# Patient Record
Sex: FEMALE | Race: BLACK OR AFRICAN AMERICAN | NOT HISPANIC OR LATINO | Employment: UNEMPLOYED | URBAN - METROPOLITAN AREA
[De-identification: names, ages, dates, MRNs, and addresses within clinical notes are randomized per-mention and may not be internally consistent; named-entity substitution may affect disease eponyms.]

---

## 2017-02-28 ENCOUNTER — OFFICE VISIT (OUTPATIENT)
Dept: GASTROENTEROLOGY | Facility: CLINIC | Age: 37
End: 2017-02-28
Payer: COMMERCIAL

## 2017-02-28 VITALS
BODY MASS INDEX: 28.47 KG/M2 | SYSTOLIC BLOOD PRESSURE: 130 MMHG | HEART RATE: 84 BPM | DIASTOLIC BLOOD PRESSURE: 90 MMHG | HEIGHT: 61 IN | WEIGHT: 150.81 LBS

## 2017-02-28 DIAGNOSIS — K21.9 GASTROESOPHAGEAL REFLUX DISEASE WITHOUT ESOPHAGITIS: ICD-10-CM

## 2017-02-28 DIAGNOSIS — R13.14 PHARYNGOESOPHAGEAL DYSPHAGIA: Primary | ICD-10-CM

## 2017-02-28 PROCEDURE — 1160F RVW MEDS BY RX/DR IN RCRD: CPT | Mod: S$GLB,,, | Performed by: INTERNAL MEDICINE

## 2017-02-28 PROCEDURE — 99203 OFFICE O/P NEW LOW 30 MIN: CPT | Mod: S$GLB,,, | Performed by: INTERNAL MEDICINE

## 2017-02-28 PROCEDURE — 99999 PR PBB SHADOW E&M-EST. PATIENT-LVL III: CPT | Mod: PBBFAC,,, | Performed by: INTERNAL MEDICINE

## 2017-02-28 NOTE — PROGRESS NOTES
Subjective:       Patient ID: Renee Ruelas is a 36 y.o. female.    Chief Complaint: Dysphagia    HPI Comments: About 4 years ago the patient had a problem with trauma to her esophagus after a problem with a large pill. She has had problems since then with swallowing. She had a dilation at that time by [facility]. She did well for a while but now she is having recurrent problems. She has no problem with liquids. There is no problem with ice cream or mashed potatoes. She does have problem with rice, peas and corn. She has problems with chips and bread ( bread and cake like foods give significant problems. She has no true problem with fish but eats this rarely. She has problems with chicken especialy white meat. She is fearful of eating steak.    She denies abdominal pain, nausea or vomiting. There is positive heartburn. There is no anorexia or weight loss. There is no aversion to the sight or smell of food. There is no BRBPR or melena. There is no FH of GI problems.  The patient does carry a diagnosis of GERD.    Review of Systems   Constitutional: Negative for activity change, appetite change, chills, diaphoresis, fatigue, fever and unexpected weight change.   HENT: Negative for congestion, ear discharge, ear pain, hearing loss, nosebleeds, postnasal drip and tinnitus.    Eyes: Negative for photophobia and visual disturbance.   Respiratory: Negative for apnea, cough, choking, chest tightness, shortness of breath and wheezing.    Cardiovascular: Negative for chest pain, palpitations and leg swelling.   Gastrointestinal: Negative for abdominal distention, abdominal pain, anal bleeding, blood in stool, constipation, diarrhea, nausea, rectal pain and vomiting.   Genitourinary: Negative for difficulty urinating, dyspareunia, dysuria, flank pain, frequency, hematuria, menstrual problem, pelvic pain, urgency, vaginal bleeding and vaginal discharge.   Musculoskeletal: Negative for arthralgias,  back pain, gait problem, joint swelling, myalgias and neck stiffness.   Skin: Negative for pallor and rash.   Neurological: Negative for dizziness, tremors, seizures, syncope, speech difficulty, weakness, numbness and headaches.   Hematological: Negative for adenopathy.   Psychiatric/Behavioral: Negative for agitation, confusion, hallucinations, sleep disturbance and suicidal ideas.       Objective:      Physical Exam   Constitutional: She is oriented to person, place, and time. She appears well-developed and well-nourished.   HENT:   Head: Normocephalic and atraumatic.   Bilateral turbinate congestion   Eyes: Conjunctivae and EOM are normal. Pupils are equal, round, and reactive to light. Right eye exhibits no discharge. Left eye exhibits no discharge. No scleral icterus.   Neck: Normal range of motion. Neck supple. No JVD present. No thyromegaly present.   Cardiovascular: Normal rate, regular rhythm, normal heart sounds and intact distal pulses.  Exam reveals no gallop and no friction rub.    No murmur heard.  Pulmonary/Chest: Effort normal and breath sounds normal. No respiratory distress. She has no wheezes. She has no rales. She exhibits no tenderness.   Abdominal: Soft. Bowel sounds are normal. She exhibits no distension and no mass. There is no tenderness. There is no rebound and no guarding.   Musculoskeletal: Normal range of motion. She exhibits no edema.   Lymphadenopathy:     She has no cervical adenopathy.   Neurological: She is alert and oriented to person, place, and time. She has normal reflexes. She exhibits normal muscle tone. Coordination normal.   Skin: Skin is warm and dry. No rash noted. No erythema. No pallor.   Psychiatric: She has a normal mood and affect. Her behavior is normal. Judgment and thought content normal.   Vitals reviewed.      Assessment:     Dysphagia    GERD  No diagnosis found.    Plan:       Esophagram and EGD

## 2017-02-28 NOTE — MR AVS SNAPSHOT
[facility]  [address] [facility]  [place] [address]  Phone: [phone]  Fax: [phone]                  Renee Ruelas   2017 9:00 AM   Office Visit    Description:  Female : 1980   Provider:  Lee Sharp III, MD   Department:  [facility]           Reason for Visit     Dysphagia           Diagnoses this Visit        Comments    Pharyngoesophageal dysphagia    -  Primary     Gastroesophageal reflux disease without esophagitis                To Do List           Goals (5 Years of Data)     None      [facility]     [facility] On Call Nurse Care Line -  Assistance  Registered nurses in the [facility] On Call Center provide clinical advisement, health education, appointment booking, and other advisory services.  Call for this free service at [phone].             Medications           Message regarding Medications     Verify the changes and/or additions to your medication regime listed below are the same as discussed with your clinician today.  If any of these changes or additions are incorrect, please notify your healthcare provider.             Verify that the below list of medications is an accurate representation of the medications you are currently taking.  If none reported, the list may be blank. If incorrect, please contact your healthcare provider. Carry this list with you in case of emergency.           Current Medications     hydrOXYzine pamoate (VISTARIL) 25 MG Cap Take 1 capsule (25 mg total) by mouth every 6 (six) hours as needed (prn palpitations).           Clinical Reference Information           Your Vitals Were     BP                   130/90           Blood Pressure          Most Recent Value    BP  (!)  130/90      Allergies as of 2017     Sulfur      Immunizations Administered on Date of Encounter - 2017     None      Orders Placed During Today's Visit      Normal Orders This Visit    Case request GI:  ESOPHAGOGASTRODUODENOSCOPY (EGD)     Future Labs/Procedures Expected by Expires    FL Esophagram Complete  2/28/2017 2/28/2018      [facility] Sign-Up     Activating your [facility] account is as easy as 1-2-3!     1) Visit [email or web address], select Sign Up Now, enter this activation code and your date of birth, then select Next.  [email or web address]  Expires: 4/14/2017 10:42 AM      2) Create a username and password to use when you visit [facility] in the future and select a security question in case you lose your password and select Next.    3) Enter your e-mail address and click Sign Up!    Additional Information  If you have questions, please e-mail [email or web address] or call [phone] to talk to our [facility] staff. Remember, [facility] is NOT to be used for urgent needs. For medical emergencies, dial 911.         Language Assistance Services     ATTENTION: Language assistance services are available, free of charge. Please call [phone].      ATENCIÓN: Si habla español, tiene a herrera disposición servicios gratuitos de asistencia lingüística. Llame al [phone].     CHÚ Ý: N?u b?n nói Ti?ng Vi?t, có các d?ch v? h? tr? ngôn ng? mi?n phí dành cho b?n. G?i s? [phone].         [facility] complies with applicable Federal civil rights laws and does not discriminate on the basis of race, color, national origin, age, disability, or sex.

## 2017-03-01 ENCOUNTER — TELEPHONE (OUTPATIENT)
Dept: GASTROENTEROLOGY | Facility: CLINIC | Age: 37
End: 2017-03-01

## 2017-03-01 NOTE — TELEPHONE ENCOUNTER
----- Message from Evans Tanner sent at 3/1/2017 12:36 PM CST -----  Contact: Pt  States she is calling to schedule her test and can be reached at [phone]//thanks/dbw

## 2017-03-03 ENCOUNTER — HOSPITAL ENCOUNTER (OUTPATIENT)
Dept: RADIOLOGY | Facility: HOSPITAL | Age: 37
Discharge: HOME | End: 2017-03-03
Attending: INTERNAL MEDICINE
Payer: COMMERCIAL

## 2017-03-03 DIAGNOSIS — K21.9 GASTROESOPHAGEAL REFLUX DISEASE WITHOUT ESOPHAGITIS: ICD-10-CM

## 2017-03-03 DIAGNOSIS — R13.14 PHARYNGOESOPHAGEAL DYSPHAGIA: ICD-10-CM

## 2017-03-03 PROCEDURE — 74220 X-RAY XM ESOPHAGUS 1CNTRST: CPT | Mod: 26,,, | Performed by: RADIOLOGY

## 2017-03-03 PROCEDURE — 74220 X-RAY XM ESOPHAGUS 1CNTRST: CPT | Mod: TC,PO

## 2017-03-07 ENCOUNTER — TELEPHONE (OUTPATIENT)
Dept: GASTROENTEROLOGY | Facility: CLINIC | Age: 37
End: 2017-03-07

## 2017-03-07 NOTE — TELEPHONE ENCOUNTER
----- Message from Terra Yee sent at 3/7/2017 11:27 AM CST -----  Contact: Pt   Pt is requesting to speak with the nurse in regards to her upcoming procedure./ Pt can be reached at [phone]

## 2017-03-08 ENCOUNTER — SURGERY (OUTPATIENT)
Age: 37
End: 2017-03-08

## 2017-03-08 ENCOUNTER — ANESTHESIA EVENT (OUTPATIENT)
Dept: ENDOSCOPY | Facility: HOSPITAL | Age: 37
End: 2017-03-08
Payer: COMMERCIAL

## 2017-03-08 ENCOUNTER — HOSPITAL ENCOUNTER (OUTPATIENT)
Facility: HOSPITAL | Age: 37
Discharge: HOME | End: 2017-03-08
Attending: INTERNAL MEDICINE | Admitting: INTERNAL MEDICINE
Payer: COMMERCIAL

## 2017-03-08 ENCOUNTER — ANESTHESIA (OUTPATIENT)
Dept: ENDOSCOPY | Facility: HOSPITAL | Age: 37
End: 2017-03-08
Payer: COMMERCIAL

## 2017-03-08 VITALS
BODY MASS INDEX: 28.32 KG/M2 | HEART RATE: 78 BPM | HEIGHT: 61 IN | TEMPERATURE: 98 F | SYSTOLIC BLOOD PRESSURE: 125 MMHG | OXYGEN SATURATION: 98 % | WEIGHT: 150 LBS | DIASTOLIC BLOOD PRESSURE: 72 MMHG | RESPIRATION RATE: 16 BRPM

## 2017-03-08 DIAGNOSIS — R13.10 DYSPHAGIA: ICD-10-CM

## 2017-03-08 DIAGNOSIS — R13.14 PHARYNGOESOPHAGEAL DYSPHAGIA: Primary | ICD-10-CM

## 2017-03-08 DIAGNOSIS — F41.9 ANXIETY: ICD-10-CM

## 2017-03-08 LAB
B-HCG UR QL: NEGATIVE
CTP QC/QA: YES

## 2017-03-08 PROCEDURE — 25000003 PHARM REV CODE 250: Performed by: INTERNAL MEDICINE

## 2017-03-08 PROCEDURE — 63600175 PHARM REV CODE 636 W HCPCS: Performed by: NURSE ANESTHETIST, CERTIFIED REGISTERED

## 2017-03-08 PROCEDURE — 25000003 PHARM REV CODE 250: Performed by: NURSE ANESTHETIST, CERTIFIED REGISTERED

## 2017-03-08 PROCEDURE — 27201012 HC FORCEPS, HOT/COLD, DISP: Performed by: INTERNAL MEDICINE

## 2017-03-08 PROCEDURE — 88305 TISSUE EXAM BY PATHOLOGIST: CPT | Mod: 26,,, | Performed by: PATHOLOGY

## 2017-03-08 PROCEDURE — 81025 URINE PREGNANCY TEST: CPT | Performed by: INTERNAL MEDICINE

## 2017-03-08 PROCEDURE — 37000008 HC ANESTHESIA 1ST 15 MINUTES: Performed by: INTERNAL MEDICINE

## 2017-03-08 PROCEDURE — 43239 EGD BIOPSY SINGLE/MULTIPLE: CPT | Performed by: INTERNAL MEDICINE

## 2017-03-08 PROCEDURE — 88305 TISSUE EXAM BY PATHOLOGIST: CPT | Performed by: PATHOLOGY

## 2017-03-08 PROCEDURE — 43239 EGD BIOPSY SINGLE/MULTIPLE: CPT | Mod: ,,, | Performed by: INTERNAL MEDICINE

## 2017-03-08 RX ORDER — PROPOFOL 10 MG/ML
VIAL (ML) INTRAVENOUS
Status: DISCONTINUED | OUTPATIENT
Start: 2017-03-08 | End: 2017-03-08

## 2017-03-08 RX ORDER — LIDOCAINE HYDROCHLORIDE 10 MG/ML
INJECTION INFILTRATION; PERINEURAL
Status: DISCONTINUED | OUTPATIENT
Start: 2017-03-08 | End: 2017-03-08

## 2017-03-08 RX ORDER — SODIUM CHLORIDE, SODIUM LACTATE, POTASSIUM CHLORIDE, CALCIUM CHLORIDE 600; 310; 30; 20 MG/100ML; MG/100ML; MG/100ML; MG/100ML
INJECTION, SOLUTION INTRAVENOUS CONTINUOUS
Status: DISCONTINUED | OUTPATIENT
Start: 2017-03-08 | End: 2017-03-08 | Stop reason: HOSPADM

## 2017-03-08 RX ADMIN — SODIUM CHLORIDE, SODIUM LACTATE, POTASSIUM CHLORIDE, AND CALCIUM CHLORIDE: 600; 310; 30; 20 INJECTION, SOLUTION INTRAVENOUS at 12:03

## 2017-03-08 RX ADMIN — PROPOFOL 50 MG: 10 INJECTION, EMULSION INTRAVENOUS at 02:03

## 2017-03-08 RX ADMIN — PROPOFOL 100 MG: 10 INJECTION, EMULSION INTRAVENOUS at 02:03

## 2017-03-08 RX ADMIN — LIDOCAINE HYDROCHLORIDE 40 MG: 10 INJECTION, SOLUTION INFILTRATION; PERINEURAL at 02:03

## 2017-03-08 NOTE — INTERVAL H&P NOTE
The patient has been examined and the H&P has been reviewed:  Family History   Problem Relation Age of Onset    Diabetes Father     Kidney disease Father      Past Medical History:   Diagnosis Date    Anxiety     Esophageal stricture     GERD (gastroesophageal reflux disease)     Migraine headache      Past Surgical History:   Procedure Laterality Date    ESOPHAGEAL DILATION      ESOPHAGOGASTRODUODENOSCOPY       Social History     Social History    Marital status:      Spouse name: N/A    Number of children: N/A    Years of education: N/A     Occupational History    Not on file.     Social History Main Topics    Smoking status: Never Smoker    Smokeless tobacco: Never Used    Alcohol use No    Drug use: No    Sexual activity: Yes     Partners: Male     Other Topics Concern    Not on file     Social History Narrative     Review of patient's allergies indicates:   Allergen Reactions    Sulfur Swelling     No current facility-administered medications on file prior to encounter.      Current Outpatient Prescriptions on File Prior to Encounter   Medication Sig Dispense Refill    hydrOXYzine pamoate (VISTARIL) 25 MG Cap Take 1 capsule (25 mg total) by mouth every 6 (six) hours as needed (prn palpitations). 20 capsule 0           Anesthesia/Surgery risks, benefits and alternative options discussed and understood by patient/family.          There are no hospital problems to display for this patient.

## 2017-03-08 NOTE — INTERVAL H&P NOTE
The patient has been examined and the H&P has been reviewed:I have reviewed this note and I agree with this assessment. The patient was seen in the GI office and remains stable for endoscopy at the time of this present evaluation.         Anesthesia/Surgery risks, benefits and alternative options discussed and understood by patient/family.          There are no hospital problems to display for this patient.

## 2017-03-08 NOTE — IP AVS SNAPSHOT
[address] [place] [address]           Patient Discharge Instructions     Our goal is to set you up for success. This packet includes information on your condition, medications, and your home care. It will help you to care for yourself so you don't get sicker and need to go back to the hospital.     Please ask your nurse if you have any questions.        There are many details to remember when preparing to leave the hospital. Here is what you will need to do:    1. Take your medicine. If you are prescribed medications, review your Medication List in the following pages. You may have new medications to  at the pharmacy and others that you'll need to stop taking. Review the instructions for how and when to take your medications. Talk with your doctor or nurses if you are unsure of what to do.     2. Go to your follow-up appointments. Specific follow-up information is listed in the following pages. Your may be contacted by a transition nurse or clinical provider about future appointments. Be sure we have all of the phone numbers to reach you, if needed. Please contact your provider's office if you are unable to make an appointment.     3. Watch for warning signs. Your doctor or nurse will give you detailed warning signs to watch for and when to call for assistance. These instructions may also include educational information about your condition. If you experience any of warning signs to your health, call your doctor.               ** Verify the list of medication(s) below is accurate and up to date. Carry this with you in case of emergency. If your medications have changed, please notify your healthcare provider.             Medication List      ASK your doctor about these medications        Additional Info                      hydrOXYzine pamoate 25 MG Cap   Commonly known as:  VISTARIL   Quantity:  20 capsule   Refills:  0   Dose:  25 mg    Instructions:  Take  1 capsule (25 mg total) by mouth every 6 (six) hours as needed (prn palpitations).     Begin Date    AM    Noon    PM    Bedtime                  Please bring to all follow up appointments:    1. A copy of your discharge instructions.  2. All medicines you are currently taking in their original bottles.  3. Identification and insurance card.    Please arrive 15 minutes ahead of scheduled appointment time.    Please call 24 hours in advance if you must reschedule your appointment and/or time.            Discharge Instructions         Upper GI Endoscopy     During endoscopy, a long, flexible tube is used to view the inside of your upper GI tract.      Upper GI endoscopy allows your healthcare provider to look directly into the beginning of your gastrointestinal (GI) tract. The esophagus, stomach, and duodenum (the first part of the small intestine) make up the upper GI tract.   Before the exam  Follow these and any other instructions you are given before your endoscopy. If you dont follow the healthcare providers instructions carefully, the test may need to be canceled or done over:  · Don't eat or drink anything after midnight the night before your exam. If your exam is in the afternoon, drink only clear liquids in the morning. Don't eat or drink anything for 8 hours before the exam. In some cases, you may be able to take medicines with sips of water until 2 hours before the procedure. Speak with your healthcare provider about this.   · Bring your X-rays and any other test results you have.  · Because you will be sedated, arrange for an adult to drive you home after the exam.  · Tell your healthcare provider before the exam if you are taking any medicines or have any medical problems.  The procedure  Here is what to expect:  · You will lie on the endoscopy table. Usually patients lie on the left side.  · You will be monitored and given oxygen.  · Your throat may be numbed with a spray or gargle. You are given  "medicine through an intravenous (IV) line that will help you relax and remain comfortable. You may be awake or asleep during the procedure.  · The healthcare provider will put the endoscope in your mouth and down your esophagus. It is thinner than most pieces of food that you swallow. It will not affect your breathing. The medicine helps keep you from gagging.  · Air is put into your GI tract to expand it. It can make you burp.  · During the procedure, the healthcare provider can take biopsies (tissue samples), remove abnormalities, such as polyps, or treat abnormalities through a variety of devices placed through the endoscope. You will not feel this.   · The endoscope carries images of your upper GI tract to a video screen. If you are awake, you may be able to look at the images.  · After the procedure is done, you will rest for a time. An adult must drive you home.  When to call your healthcare provider  Contact your healthcare provider if you have:  · Black or tarry stools, or blood in your stool  · Fever  · Pain in your belly that does not go away  · Nausea and vomiting, or vomiting blood   Date Last Reviewed: 7/1/2016  © 0469-4011 [employer]. [address] [place] [address]. All rights reserved. This information is not intended as a substitute for professional medical care. Always follow your healthcare professional's instructions.            Admission Information     Date & Time Provider Department CSN    3/8/2017 11:20 AM Lee Sharp III, MD [facility] [id number]      Care Providers     Provider Role Specialty Primary office phone    Lee Sharp III, MD Attending Provider Gastroenterology [phone]    Lee Sharp III, MD Surgeon  Gastroenterology [phone]      Your Vitals Were     BP Pulse Temp Resp Height Weight    136/99 (BP Location: Left arm, Patient Position: Lying, BP Method: Automatic) 92 97.7 °F (36.5 °C) (Oral) 16 5' 1" (1.549 m) 68 kg " (150 lb)    Last Period SpO2 BMI          02/24/2017 99% 28.34 kg/m2        Recent Lab Values     No lab values to display.      Pending Labs     Order Current Status    Specimen to Pathology - Surgery Collected (03/08/17 1437)      Allergies as of 3/8/2017        Reactions    Sulfur Swelling      [facility]     [facility] Care [facility] - 24/7 Assistance  Unless otherwise directed by your provider, please contact [facility], our nurse care line that is available for 24/7 assistance.     Registered nurses in the [facility] provide clinical advisement, health education, appointment booking, and other advisory services.  Call for this free service at [phone].        Advance Directives     An advance directive is a document which, in the event you are no longer able to make decisions for yourself, tells your healthcare team what kind of treatment you do or do not want to receive, or who you would like to make those decisions for you.  If you do not currently have an advance directive, [facility] encourages you to create one.  For more information call:  [phone]), [phone] ([phone]),  or log on to [email or web address].        Language Assistance Services     ATTENTION: Language assistance services are available, free of charge. Please call [phone].      ATENCIÓN: Si habla español, tiene a herrera disposición servicios gratuitos de asistencia lingüística. Llame al [phone].     CHÚ Ý: N?u b?n nói Ti?ng Vi?t, có các d?ch v? h? tr? ngôn ng? mi?n phí dành cho b?n. G?i s? [phone].        [facility] Sign-Up     Activating your [facility] account is as easy as 1-2-3!     1) Visit [email or web address], select Sign Up Now, enter this activation code and your date of birth, then select Next.  [email or web address]  Expires: 4/14/2017 10:42 AM      2) Create a username and password to use when you visit [facility] in the future and select a security question in case  you lose your password and select Next.    3) Enter your e-mail address and click Sign Up!    Additional Information  If you have questions, please e-mail [email or web address] or call [phone] to talk to our [facility] staff. Remember, [facility] is NOT to be used for urgent needs. For medical emergencies, dial 911.          [facility] complies with applicable Federal civil rights laws and does not discriminate on the basis of race, color, national origin, age, disability, or sex.

## 2017-03-08 NOTE — ANESTHESIA RELEASE NOTE
"Anesthesia Release from PACU Note    Patient: Renee Ruelas    Procedure(s) Performed: Procedure(s) (LRB):  ESOPHAGOGASTRODUODENOSCOPY (EGD) (N/A)    Anesthesia type: MAC    Post pain: Adequate analgesia    Post assessment: no apparent anesthetic complications, tolerated procedure well and no evidence of recall    Last Vitals:   Visit Vitals    /88    Pulse 92    Temp 36.5 °C (97.7 °F) (Oral)    Resp 16    Ht 5' 1" (1.549 m)    Wt 68 kg (150 lb)    LMP 02/24/2017    SpO2 98%    Breastfeeding No    BMI 28.34 kg/m2       Post vital signs: stable    Level of consciousness: awake and alert     Nausea/Vomiting: no nausea/no vomiting    Complications: none    Airway Patency: patent    Respiratory: unassisted, spontaneous ventilation    Cardiovascular: stable and blood pressure at baseline    Hydration: euvolemic  "

## 2017-03-08 NOTE — ANESTHESIA POSTPROCEDURE EVALUATION
"Anesthesia Post Evaluation    Patient: Renee Ruelas    Procedure(s) Performed: Procedure(s) (LRB):  ESOPHAGOGASTRODUODENOSCOPY (EGD) (N/A)    Final Anesthesia Type: MAC  Patient location during evaluation: GI PACU  Patient participation: Yes- Able to Participate  Level of consciousness: awake and alert  Post-procedure vital signs: reviewed and stable  Pain management: adequate  Airway patency: patent  PONV status at discharge: No PONV  Anesthetic complications: no      Cardiovascular status: blood pressure returned to baseline  Respiratory status: unassisted and spontaneous ventilation  Hydration status: euvolemic  Follow-up not needed.        Visit Vitals    /88    Pulse 92    Temp 36.5 °C (97.7 °F) (Oral)    Resp 16    Ht 5' 1" (1.549 m)    Wt 68 kg (150 lb)    LMP 02/24/2017    SpO2 98%    Breastfeeding No    BMI 28.34 kg/m2       Pain/Tony Score: Pain Assessment Performed: Yes (3/8/2017  2:56 PM)  Presence of Pain: denies (3/8/2017  2:56 PM)  Tony Score: 10 (3/8/2017  2:56 PM)      "

## 2017-03-08 NOTE — DISCHARGE INSTRUCTIONS

## 2017-03-08 NOTE — ANESTHESIA PREPROCEDURE EVALUATION
03/08/2017  Renee Rueals is a 36 y.o., female.    [facility] Anesthesia Evaluation    I have reviewed the Patient Summary Reports.     I have reviewed the Medications.     Review of Systems  Anesthesia Hx:  No problems with previous Anesthesia    Hepatic/GI:   Esophageal stricture   Neurological:   Headaches        Physical Exam  General:  Well nourished    Airway/Jaw/Neck:  Airway Findings: Mouth Opening: Normal Tongue: Normal  Mallampati: II  TM Distance: Normal, at least 6 cm      Dental:  Dental Findings: In tact   Chest/Lungs:  Chest/Lungs Findings: Normal Respiratory Rate     Heart/Vascular:  Heart Findings: Rate: Normal  Rhythm: Regular Rhythm             Anesthesia Plan  Type of Anesthesia, risks & benefits discussed:  Anesthesia Type:  MAC  Patient's Preference:   Intra-op Monitoring Plan:   Intra-op Monitoring Plan Comments:   Post Op Pain Control Plan:   Post Op Pain Control Plan Comments:   Induction:   IV  Beta Blocker:  Patient is not currently on a Beta-Blocker (No further documentation required).       Informed Consent: Patient understands risks and agrees with Anesthesia plan.  Questions answered. Anesthesia consent signed with patient.  ASA Score: 2     Day of Surgery Review of History & Physical: I have interviewed and examined the patient. I have reviewed the patient's H&P dated:   Significant changes noted: Surgeon notified.          Ready For Surgery From Anesthesia Perspective.

## 2017-03-08 NOTE — TRANSFER OF CARE
"Anesthesia Transfer of Care Note    Patient: Renee Ruelas    Procedure(s) Performed: Procedure(s) (LRB):  ESOPHAGOGASTRODUODENOSCOPY (EGD) (N/A)    Patient location: GI    Anesthesia Type: MAC    Transport from OR: Transported from OR on room air with adequate spontaneous ventilation    Post pain: adequate analgesia    Post assessment: no apparent anesthetic complications    Post vital signs: stable    Level of consciousness: awake    Nausea/Vomiting: no nausea/vomiting    Complications: none          Last vitals:   Visit Vitals    /88    Pulse 92    Temp 36.5 °C (97.7 °F) (Oral)    Resp 16    Ht 5' 1" (1.549 m)    Wt 68 kg (150 lb)    LMP 02/24/2017    SpO2 98%    Breastfeeding No    BMI 28.34 kg/m2     "

## 2017-03-08 NOTE — DISCHARGE SUMMARY
[facility]  Brief Operative Note     SUMMARY     Surgery Date: 3/8/2017     Surgeon(s) and Role:     * Lee Sharp III, MD - Primary    Assisting Surgeon: None    Pre-op Diagnosis:  Gastroesophageal reflux disease without esophagitis [K21.9]  Pharyngoesophageal dysphagia [R13.14]    Post-op Diagnosis:  Post-Op Diagnosis Codes:     * Gastroesophageal reflux disease without esophagitis [K21.9]     * Pharyngoesophageal dysphagia [R13.14]     - Gastric Polyps  Procedure(s) (LRB):  ESOPHAGOGASTRODUODENOSCOPY (EGD) (N/A)    Anesthesia: Monitor Anesthesia Care    Description of the findings of the procedure: Procedures completed. See Procedure note for full details.    Findings/Key Components: Procedures completed. See Procedure note for full details.    Prosthetics/Devices: None    Estimated Blood Loss: * No values recorded between 3/8/2017 12:00 AM and 3/8/2017  2:48 PM *         Specimens:   Specimen (12h ago through future)    Start     Ordered    03/08/17 1434  Specimen to Pathology - Surgery  Once     Comments:  1. Gastric corpus polyps    03/08/17 1437          Discharge Note    SUMMARY     Admit Date: 3/8/2017    Discharge Date and Time: 3/8/2017    Hospital Course (synopsis of major diagnoses, care, treatment, and services provided during the course of the hospital stay):  Procedures completed. See Procedure note for full details. Discharge patient when discharge criteria met.    Final Diagnosis: Post-Op Diagnosis Codes:     * Gastroesophageal reflux disease without esophagitis [K21.9]     * Pharyngoesophageal dysphagia [R13.14]      - Gastric Polyps  Disposition: Discharge patient when discharge criteria met.    Follow Up/Patient Instructions:       Medications:  Reconciled Home Medications: Current Discharge Medication List      STOP taking these medications       hydrOXYzine pamoate (VISTARIL) 25 MG Cap Comments:   Reason for Stopping:                Discharge Procedure Orders  Diet  general     Activity as tolerated

## 2017-03-14 ENCOUNTER — TELEPHONE (OUTPATIENT)
Dept: GASTROENTEROLOGY | Facility: CLINIC | Age: 37
End: 2017-03-14

## 2017-03-14 NOTE — TELEPHONE ENCOUNTER
----- Message from Hailee Treviño sent at 3/14/2017  8:57 AM CDT -----  Pt is returning a call from nurse to f/u with her procedure.            Please call pt back at [phone]

## 2017-03-15 RX ORDER — PANTOPRAZOLE SODIUM 20 MG/1
20 TABLET, DELAYED RELEASE ORAL DAILY
Qty: 30 TABLET | Refills: 0 | Status: CANCELLED | OUTPATIENT
Start: 2017-03-15 | End: 2018-03-15

## 2017-03-15 RX ORDER — HYDROGEN PEROXIDE 3 %
20 SOLUTION, NON-ORAL MISCELLANEOUS
Qty: 30 CAPSULE | Refills: 0 | Status: SHIPPED | OUTPATIENT
Start: 2017-03-15 | End: 2017-11-27 | Stop reason: ALTCHOICE

## 2017-03-15 NOTE — TELEPHONE ENCOUNTER
----- Message from Anuja Kemp sent at 3/14/2017 12:11 PM CDT -----  Contact: Pt  Pt states she is returning nurse call, please contact pt at [phone]

## 2017-03-15 NOTE — TELEPHONE ENCOUNTER
----- Message from Alka Gross sent at 3/15/2017 10:30 AM CDT -----  Contact: pt  Pt is requesting to speak with nurse regarding throat issues and being unable to eat. Pt stated she had surgery on 3/8/17. Pls call pt back at [phone]

## 2017-04-06 ENCOUNTER — TELEPHONE (OUTPATIENT)
Dept: GASTROENTEROLOGY | Facility: CLINIC | Age: 37
End: 2017-04-06

## 2017-04-06 NOTE — TELEPHONE ENCOUNTER
Please review path results dated 03/08/17 and advice recall to stay the same as procedure recommendation.

## 2017-11-27 ENCOUNTER — OFFICE VISIT (OUTPATIENT)
Dept: GASTROENTEROLOGY | Facility: CLINIC | Age: 37
End: 2017-11-27
Payer: COMMERCIAL

## 2017-11-27 VITALS
SYSTOLIC BLOOD PRESSURE: 225 MMHG | WEIGHT: 159.63 LBS | DIASTOLIC BLOOD PRESSURE: 88 MMHG | HEART RATE: 89 BPM | HEIGHT: 61 IN | BODY MASS INDEX: 30.14 KG/M2

## 2017-11-27 DIAGNOSIS — K21.9 GASTROESOPHAGEAL REFLUX DISEASE WITHOUT ESOPHAGITIS: ICD-10-CM

## 2017-11-27 DIAGNOSIS — R13.14 PHARYNGOESOPHAGEAL DYSPHAGIA: Primary | ICD-10-CM

## 2017-11-27 PROCEDURE — 99214 OFFICE O/P EST MOD 30 MIN: CPT | Mod: S$GLB,,, | Performed by: NURSE PRACTITIONER

## 2017-11-27 PROCEDURE — 99999 PR PBB SHADOW E&M-EST. PATIENT-LVL III: CPT | Mod: PBBFAC,,, | Performed by: NURSE PRACTITIONER

## 2017-11-27 RX ORDER — ESOMEPRAZOLE MAGNESIUM 40 MG/1
40 CAPSULE, DELAYED RELEASE ORAL
Qty: 30 CAPSULE | Refills: 3 | Status: SHIPPED | OUTPATIENT
Start: 2017-11-27 | End: 2019-07-09

## 2017-11-27 NOTE — PROGRESS NOTES
[facility] Note    Reason for Visit:  The primary encounter diagnosis was Pharyngoesophageal dysphagia. A diagnosis of Gastroesophageal reflux disease without esophagitis was also pertinent to this visit.    PCP:   Primary Doctor No       Referring MD:  No referring provider defined for this encounter.    HPI:  This is a 37 y.o. female here for evaluation of pharyngoesophageal dysphagia and Gerd. Has been worked up at  [facility] in [place] and with Dr. Sharp in february. She is new to me.     4 years ago had a trauma to her esophagus with a large pill. Since has had problems with swallowing. She had a dilation at that time by [place]. Gu[place] at [facility]. She did well for a while but began having recurrent problems. Hx of taking Nexium and Xanax and relief with dysphagia. Reports esophageal manometry done in 2012 at [facility] and normal.     Currently, pt reports fearful of eating solids. Feels solids getting stuck and hung up in esophagus. Will have one episode occur every few months where food will get hung up in esophagus. Denies difficulty swallowing liquids, initiating first swallow, painful swallowing, SOB, inducing vomiting, and CP. Last EGD in February when worked up with Dr. Sharp. EGD 3/8/17 No gross lesions in esophagus, few gastric polyps removed, and normal duodenum. Esophogram 3/3/17 Mild intermittent GE reflux, otherwise normal. NSAID usage- none.     Hx of Gerd. Pyrosis occurring a couple times per week. Denies acidic taste in mouth and regurgitation. No current PPI use.      Normal formed stool daily. Denies abdominal pain, blood in stool, and black tarry stool.     ROS:  Constitutional: No fevers, no chills, No unintentional weight loss, no fatigue   ENT: No allergies  CV: No chest pain, no palpitations, no perif. edema  Pulm: No cough, No shortness of breath, no wheezes, no sputum  Ophtho: No vision changes  GI: see HPI; also no nausea, no vomiting, no change in  "appetite  Derm: No rash  Heme: No lymphadenopathy, No bruising  MSK: No arthritis, no muscle pain, no muscle weakness  : No dysuria, No hematuria  Endo: No hot or cold intolerance  Neuro: No syncope, No seizure     Medical History:  has a past medical history of Anxiety; Esophageal stricture; GERD (gastroesophageal reflux disease); and Migraine headache.    Surgical History:  has a past surgical history that includes Esophageal dilation and Esophagogastroduodenoscopy.    Family History: family history includes Diabetes in her father; Kidney disease in her father; Lymphoma in her mother..     Social History:  reports that she has never smoked. She has never used smokeless tobacco. She reports that she does not drink alcohol or use drugs.    Review of patient's allergies indicates:   Allergen Reactions    Sulfur Swelling     Current Outpatient Prescriptions   Medication Sig    esomeprazole (NEXIUM) 40 MG capsule Take 1 capsule (40 mg total) by mouth before breakfast. Take 30-45 minutes before first protein containing meal.     No current facility-administered medications for this visit.      Objective Findings:    Vital Signs:  BP (!) 225/88   Pulse 89   Ht 5' 1" (1.549 m)   Wt 72.4 kg (159 lb 9.8 oz)   BMI 30.16 kg/m²   Body mass index is 30.16 kg/m².    Physical Exam:  General Appearance: Mood appears anxious, no acute distress.   Head: Normocephalic, without obvious abnormality  Eyes: No scleral icterus, EOMI  ENT: Neck supple, Lips, mucosa, and tongue normal; teeth and gums normal  Lungs: CTA bilaterally in anterior and posterior fields, no wheezes, no crackles.  Heart: Regular rate and rhythm, no murmurs heard  Abdomen: Soft, non tender, non distended with positive bowel sounds in all four quadrants.  Extremities: No clubbing, cyanosis or edema  Skin: No rash to exposed areas  Neurologic: AAOx4    Labs:  Lab Results   Component Value Date    WBC 9.00 11/10/2016    HGB 12.8 11/10/2016    HCT 37.4 " 11/10/2016     11/10/2016    ALT 14 11/10/2016    AST 19 11/10/2016     11/10/2016    K 4.5 11/10/2016     11/10/2016    CREATININE 0.9 11/10/2016    BUN 14 11/10/2016    CO2 23 11/10/2016     Imaging:  Esophogram 3/3/17 Mild intermittent GE reflux, otherwise normal.    Endoscopy:    EGD- 3/8/17 No gross lesions in esophagus, few gastric polyps removed, and normal duodenum.     Assessment:  1. Pharyngoesophageal dysphagia    2. Gastroesophageal reflux disease without esophagitis      Recommendations:  1. & 2. Recommended esophageal manometry and pt deferring at this time Will trial Nexium 40 mg daily and see if relief.      Requesting [facility] records [facility] [place].     Follow up in 4 weeks.     Orders Placed This Encounter    esomeprazole (NEXIUM) 40 MG capsule     Thank you so much for allowing me to participate in the care of Renee York, ROSARIO, FNP-C

## 2018-01-02 ENCOUNTER — DOCUMENTATION ONLY (OUTPATIENT)
Dept: GASTROENTEROLOGY | Facility: CLINIC | Age: 38
End: 2018-01-02

## 2018-01-02 NOTE — PROGRESS NOTES
[facility].     EGD 5/23/2012- Nodule in lowers esophagus, bx obtained. Otherwise unremarkable. Dilation performed at entire esophagus.     Recommendation of esophageal manometry if dysphagia persists.     Path report- Esophagus nodule bx- squamous and glandular mucosa with mild reactive changes negative for intestinal metaplasia, dysplasia, and malignancies.     Ying York, SYEDA-C

## 2019-07-09 ENCOUNTER — OFFICE VISIT (OUTPATIENT)
Dept: OTOLARYNGOLOGY | Facility: CLINIC | Age: 39
End: 2019-07-09
Payer: COMMERCIAL

## 2019-07-09 VITALS
WEIGHT: 162.25 LBS | DIASTOLIC BLOOD PRESSURE: 93 MMHG | HEART RATE: 71 BPM | SYSTOLIC BLOOD PRESSURE: 131 MMHG | BODY MASS INDEX: 30.66 KG/M2 | TEMPERATURE: 98 F

## 2019-07-09 DIAGNOSIS — R13.19 ESOPHAGEAL DYSPHAGIA: Primary | ICD-10-CM

## 2019-07-09 DIAGNOSIS — K21.9 GASTROESOPHAGEAL REFLUX DISEASE, ESOPHAGITIS PRESENCE NOT SPECIFIED: ICD-10-CM

## 2019-07-09 DIAGNOSIS — F41.9 ANXIETY: ICD-10-CM

## 2019-07-09 PROCEDURE — 99204 OFFICE O/P NEW MOD 45 MIN: CPT | Mod: S$GLB,,, | Performed by: NURSE PRACTITIONER

## 2019-07-09 PROCEDURE — 3008F BODY MASS INDEX DOCD: CPT | Mod: CPTII,S$GLB,, | Performed by: NURSE PRACTITIONER

## 2019-07-09 PROCEDURE — 3008F PR BODY MASS INDEX (BMI) DOCUMENTED: ICD-10-PCS | Mod: CPTII,S$GLB,, | Performed by: NURSE PRACTITIONER

## 2019-07-09 PROCEDURE — 99999 PR PBB SHADOW E&M-EST. PATIENT-LVL IV: CPT | Mod: PBBFAC,,, | Performed by: NURSE PRACTITIONER

## 2019-07-09 PROCEDURE — 99999 PR PBB SHADOW E&M-EST. PATIENT-LVL IV: ICD-10-PCS | Mod: PBBFAC,,, | Performed by: NURSE PRACTITIONER

## 2019-07-09 PROCEDURE — 99204 PR OFFICE/OUTPT VISIT, NEW, LEVL IV, 45-59 MIN: ICD-10-PCS | Mod: S$GLB,,, | Performed by: NURSE PRACTITIONER

## 2019-07-09 RX ORDER — FLUTICASONE PROPIONATE 50 MCG
2 SPRAY, SUSPENSION (ML) NASAL DAILY
Qty: 16 G | Refills: 12 | Status: SHIPPED | OUTPATIENT
Start: 2019-07-09 | End: 2019-08-08

## 2019-07-09 RX ORDER — FLUTICASONE PROPIONATE 50 MCG
2 SPRAY, SUSPENSION (ML) NASAL DAILY
Qty: 16 G | Refills: 12 | Status: SHIPPED | OUTPATIENT
Start: 2019-07-09 | End: 2019-07-09 | Stop reason: SDUPTHER

## 2019-07-09 RX ORDER — PANTOPRAZOLE SODIUM 40 MG/1
40 TABLET, DELAYED RELEASE ORAL DAILY
COMMUNITY
End: 2019-07-11 | Stop reason: SDUPTHER

## 2019-07-09 NOTE — PROGRESS NOTES
"Subjective:       Patient ID: Renee Ruelas is a 38 y.o. female.    Chief Complaint: Consult (h/o throat dilation 2012, 2017, still has trouble with throat closing)    HPI   Renee Ruelas is a 38 year old female who presents the the head and neck clinic for a sensation that her throat is closing. The problem initially began in 2012 after a pill got stuck in her esophagus. She reports dysphagia to solids 1-2 times monthly. The problem occurs randomly and with different types of foods. She will develop a sensation that her throat is tightening and will have to wait for food to pass. She will try to swallow water to push the food down. She is able to get liquids down, but will sometimes feel that she has food stuck in her throat for several hours. Her symptoms will then self resolve. She has had several esophageal dilations, most recently in March, 2017. She did not have any relief of her symptoms. She reports she had a "swallowing test" recently in [place], and was told it was normal. She does not have those records with her today. She has a frequent sore throat and hoarse voice. This is worse in the morning. She also complains of post nasal drip and a sensation of mucus in her throat. She reports heartburn and acid reflux. She has frequent cough and throat clearing. Last week she started taking Protonix twice daily. She has not noticed any effects yet. She is a non smoker.    She reports that she has anxiety and thinks this may be related to her swallowing concerns. She does not take medication for this.    Past Medical History:   Diagnosis Date    Anxiety     Esophageal stricture     GERD (gastroesophageal reflux disease)     Migraine headache        Past Surgical History:   Procedure Laterality Date    ESOPHAGEAL DILATION      ESOPHAGOGASTRODUODENOSCOPY      ESOPHAGOGASTRODUODENOSCOPY (EGD) N/A 3/8/2017    Performed by Lee Sharp III, MD at [facility]         Current Outpatient " Medications:     pantoprazole (PROTONIX) 40 MG tablet, Take 40 mg by mouth once daily., Disp: , Rfl:     fluticasone propionate (FLONASE) 50 mcg/actuation nasal spray, 2 sprays (100 mcg total) by Each Nare route once daily., Disp: 16 g, Rfl: 12    ranitidine (ZANTAC) 150 MG tablet, Take 1 tablet (150 mg total) by mouth nightly., Disp: 30 tablet, Rfl: 11    Review of patient's allergies indicates:   Allergen Reactions    Sulfur Swelling       Social History     Socioeconomic History    Marital status:      Spouse name: Not on file    Number of children: Not on file    Years of education: Not on file    Highest education level: Not on file   Occupational History    Not on file   Social Needs    Financial resource strain: Not on file    Food insecurity:     Worry: Not on file     Inability: Not on file    Transportation needs:     Medical: Not on file     Non-medical: Not on file   Tobacco Use    Smoking status: Never Smoker    Smokeless tobacco: Never Used   Substance and Sexual Activity    Alcohol use: No    Drug use: No    Sexual activity: Yes     Partners: Male   Lifestyle    Physical activity:     Days per week: Not on file     Minutes per session: Not on file    Stress: Not on file   Relationships    Social connections:     Talks on phone: Not on file     Gets together: Not on file     Attends Worship service: Not on file     Active member of club or organization: Not on file     Attends meetings of clubs or organizations: Not on file     Relationship status: Not on file   Other Topics Concern    Not on file   Social History Narrative    Not on file       Family History   Problem Relation Age of Onset    Diabetes Father     Kidney disease Father     Lymphoma Mother     Colon cancer Neg Hx     Colon polyps Neg Hx     Esophageal cancer Neg Hx     Irritable bowel syndrome Neg Hx     Inflammatory bowel disease Neg Hx     Stomach cancer Neg Hx        Review of Systems    Constitutional: Negative for appetite change, chills, diaphoresis, fatigue, fever and unexpected weight change.   HENT: Positive for postnasal drip, sore throat, trouble swallowing and voice change. Negative for congestion, dental problem, drooling, ear discharge, ear pain, facial swelling, hearing loss, mouth sores, nosebleeds, rhinorrhea, sinus pressure, sneezing and tinnitus.    Eyes: Negative for pain, discharge, redness and itching.   Respiratory: Positive for cough and shortness of breath.    Cardiovascular: Positive for chest pain.   Gastrointestinal: Positive for nausea. Negative for abdominal distention, abdominal pain, diarrhea and vomiting.   Endocrine: Negative for cold intolerance and heat intolerance.   Genitourinary: Negative for difficulty urinating.   Musculoskeletal: Negative for neck pain and neck stiffness.   Skin: Negative for rash.   Neurological: Negative for dizziness, weakness and headaches.   Hematological: Negative for adenopathy.   Psychiatric/Behavioral: The patient is nervous/anxious.        Objective:      Physical Exam   Constitutional: She is oriented to person, place, and time. She appears well-developed and well-nourished. She is cooperative. She does not appear ill. No distress.   HENT:   Head: Normocephalic and atraumatic.   Right Ear: Hearing, tympanic membrane, external ear and ear canal normal.   Left Ear: Hearing, tympanic membrane, external ear and ear canal normal.   Nose: Nose normal. No mucosal edema, rhinorrhea or nasal deformity. No epistaxis.  No foreign bodies. Right sinus exhibits no maxillary sinus tenderness and no frontal sinus tenderness. Left sinus exhibits no maxillary sinus tenderness and no frontal sinus tenderness.   Mouth/Throat: Uvula is midline, oropharynx is clear and moist and mucous membranes are normal. Mucous membranes are not pale, not dry and not cyanotic. She does not have dentures. No oral lesions. No trismus in the jaw. Normal dentition. No  uvula swelling or dental caries. No oropharyngeal exudate, posterior oropharyngeal edema, posterior oropharyngeal erythema or tonsillar abscesses.   Eyes: Conjunctivae are normal. Right eye exhibits no discharge. Left eye exhibits no discharge. No scleral icterus.   Neck: Trachea normal, normal range of motion and phonation normal. Neck supple. No JVD present. No tracheal tenderness present. No tracheal deviation present. No thyroid mass and no thyromegaly present.   Salivary glands - there are no lesions or asymmetric findings in the submandibular or parotid glands     Cardiovascular: Normal rate.   Pulmonary/Chest: Effort normal. No stridor. No respiratory distress.   Lymphadenopathy:        Head (right side): No submental, no submandibular, no tonsillar and no preauricular adenopathy present.        Head (left side): No submental, no submandibular, no tonsillar and no preauricular adenopathy present.     She has no cervical adenopathy.   Neurological: She is alert and oriented to person, place, and time. No cranial nerve deficit.   Skin: Skin is warm and dry. No rash noted. She is not diaphoretic. No erythema. No pallor.   Psychiatric: She has a normal mood and affect. Her behavior is normal. Thought content normal.   Vitals reviewed.          Assessment:       1. Esophageal dysphagia    2. Gastroesophageal reflux disease, esophagitis presence not specified    3. Anxiety        Plan:       Problem List Items Addressed This Visit        Psychiatric    Anxiety    Relevant Orders    Ambulatory consult to Psychology       GI    Dysphagia - Primary     38 year old female with a several year history of dysphagia. We discussed flexible laryngoscopy, however patient is anxious about undergoing the exam and chose to defer it today. We discussed that she should return to see GI. She states she wants instant relief of her GERD symptoms. We discussed taking Zantac at night and Protonix in the morning for now. We also  discussed that her anxiety may play a role in her dysphagia. She is interested in discussing this with a psychologist. Referrals to GI and psychology placed. Questions answered. RTC prn.          Relevant Orders    Ambulatory Referral to Gastroenterology    GERD (gastroesophageal reflux disease)    Relevant Orders    Ambulatory Referral to Gastroenterology          This was a 30 minute long visit with greater than 50% of the time devoted to counseling with the patient.

## 2019-07-10 PROBLEM — K21.9 GERD (GASTROESOPHAGEAL REFLUX DISEASE): Status: ACTIVE | Noted: 2019-07-10

## 2019-07-10 NOTE — ASSESSMENT & PLAN NOTE
38 year old female with a several year history of dysphagia. We discussed flexible laryngoscopy, however patient is anxious about undergoing the exam and chose to defer it today. We discussed that she should return to see GI. She states she wants instant relief of her GERD symptoms. We discussed taking Zantac at night and Protonix in the morning for now. We also discussed that her anxiety may play a role in her dysphagia. She is interested in discussing this with a psychologist. Referrals to GI and psychology placed. Questions answered. RTC prn.

## 2019-07-11 ENCOUNTER — OFFICE VISIT (OUTPATIENT)
Dept: GASTROENTEROLOGY | Facility: CLINIC | Age: 39
End: 2019-07-11
Payer: COMMERCIAL

## 2019-07-11 VITALS
WEIGHT: 168 LBS | DIASTOLIC BLOOD PRESSURE: 76 MMHG | SYSTOLIC BLOOD PRESSURE: 126 MMHG | HEART RATE: 68 BPM | HEIGHT: 61 IN | BODY MASS INDEX: 31.72 KG/M2

## 2019-07-11 DIAGNOSIS — K21.9 GASTROESOPHAGEAL REFLUX DISEASE WITHOUT ESOPHAGITIS: ICD-10-CM

## 2019-07-11 DIAGNOSIS — R13.14 PHARYNGOESOPHAGEAL DYSPHAGIA: Primary | ICD-10-CM

## 2019-07-11 PROCEDURE — 99999 PR PBB SHADOW E&M-EST. PATIENT-LVL III: ICD-10-PCS | Mod: PBBFAC,,, | Performed by: NURSE PRACTITIONER

## 2019-07-11 PROCEDURE — 99214 PR OFFICE/OUTPT VISIT, EST, LEVL IV, 30-39 MIN: ICD-10-PCS | Mod: S$GLB,,, | Performed by: NURSE PRACTITIONER

## 2019-07-11 PROCEDURE — 3008F BODY MASS INDEX DOCD: CPT | Mod: CPTII,S$GLB,, | Performed by: NURSE PRACTITIONER

## 2019-07-11 PROCEDURE — 99999 PR PBB SHADOW E&M-EST. PATIENT-LVL III: CPT | Mod: PBBFAC,,, | Performed by: NURSE PRACTITIONER

## 2019-07-11 PROCEDURE — 3008F PR BODY MASS INDEX (BMI) DOCUMENTED: ICD-10-PCS | Mod: CPTII,S$GLB,, | Performed by: NURSE PRACTITIONER

## 2019-07-11 PROCEDURE — 99214 OFFICE O/P EST MOD 30 MIN: CPT | Mod: S$GLB,,, | Performed by: NURSE PRACTITIONER

## 2019-07-11 RX ORDER — PANTOPRAZOLE SODIUM 40 MG/1
40 TABLET, DELAYED RELEASE ORAL 2 TIMES DAILY
Qty: 60 TABLET | Refills: 2 | Status: SHIPPED | OUTPATIENT
Start: 2019-07-11 | End: 2020-06-20 | Stop reason: SDUPTHER

## 2019-07-12 NOTE — PROGRESS NOTES
Clinic Consult:  [facility] Consultation Note    Reason for Consult:  The primary encounter diagnosis was Pharyngoesophageal dysphagia. A diagnosis of Gastroesophageal reflux disease without esophagitis was also pertinent to this visit.    PCP: Primary Doctor No       HPI:  This is a 38 y.o. female here for evaluation of the above  Pt states that over the last few weeks, she has had a progressive worsening of dysphagia with epigastric abdominal burning. She states that the dysphagia is to solids only. Tolerating liquids without coughing.   Has had nocturnal reflux symptoms.   Denies any melena or hematochezia  No change in medications.  No NSAID's or ETOH.    Has been taking PPI daily and Ranitidine at bedtime.  Has not had any significant improvement in the symptoms.   Last EGD 2017 without any significant findings.         Review of Systems   Constitutional: Negative for chills, fever, malaise/fatigue and weight loss.   Respiratory: Negative for cough.    Cardiovascular: Negative for chest pain.   Gastrointestinal:        Per HPI   Musculoskeletal: Negative for myalgias.   Skin: Negative for itching and rash.   Neurological: Negative for headaches.   Psychiatric/Behavioral: The patient is nervous/anxious.        Medical History:   Past Medical History:   Diagnosis Date    Anxiety     Esophageal stricture     GERD (gastroesophageal reflux disease)     Migraine headache        Surgical History:  Past Surgical History:   Procedure Laterality Date    ESOPHAGEAL DILATION      ESOPHAGOGASTRODUODENOSCOPY      ESOPHAGOGASTRODUODENOSCOPY (EGD) N/A 3/8/2017    Performed by Lee Sharp III, MD at [facility]       Family History:   Family History   Problem Relation Age of Onset    Diabetes Father     Kidney disease Father     Lymphoma Mother     Colon cancer Neg Hx     Colon polyps Neg Hx     Esophageal cancer Neg Hx     Irritable bowel syndrome Neg Hx     Inflammatory bowel disease Neg Hx   "   Stomach cancer Neg Hx        Social History:   Social History     Tobacco Use    Smoking status: Never Smoker    Smokeless tobacco: Never Used   Substance Use Topics    Alcohol use: No    Drug use: No       Allergies: Reviewed    Home Medications:   Current Outpatient Medications on File Prior to Visit   Medication Sig Dispense Refill    fluticasone propionate (FLONASE) 50 mcg/actuation nasal spray 2 sprays (100 mcg total) by Each Nare route once daily. 16 g 12    ranitidine (ZANTAC) 150 MG tablet Take 1 tablet (150 mg total) by mouth nightly. 30 tablet 11     No current facility-administered medications on file prior to visit.        Physical Exam:  Vital Signs:  /76   Pulse 68   Ht 5' 1" (1.549 m)   Wt 76.2 kg (167 lb 15.9 oz)   LMP 06/06/2019   BMI 31.74 kg/m²   Body mass index is 31.74 kg/m².  Physical Exam   Constitutional: She is oriented to person, place, and time. She appears well-developed and well-nourished.   HENT:   Head: Normocephalic.   Eyes: No scleral icterus.   Neck: Normal range of motion.   Cardiovascular: Normal rate and regular rhythm.   Pulmonary/Chest: Effort normal and breath sounds normal.   Abdominal: Soft. Bowel sounds are normal. She exhibits no distension. There is no tenderness.   Musculoskeletal: Normal range of motion.   Neurological: She is alert and oriented to person, place, and time.   Skin: Skin is warm and dry.   Psychiatric: She has a normal mood and affect.   Vitals reviewed.      Labs: Pertinent labs reviewed.    Assessment:  1. Pharyngoesophageal dysphagia    2. Gastroesophageal reflux disease without esophagitis         Recommendations:  - esophageal stricture vs worsening GERD vs other etiologies  - will plan for esophagram with potential for EGD vs swallow study  - increase PPI to BID, zantac 150mg at lunch.   - GERD diet and lifestyle discussed  - improved anxiety management also discussed.       Follow up to be determined by results of " above.        Thank you so much for allowing me to participate in the care of JEFF Lewis

## 2019-07-16 ENCOUNTER — TELEPHONE (OUTPATIENT)
Dept: RADIOLOGY | Facility: HOSPITAL | Age: 39
End: 2019-07-16

## 2019-07-17 ENCOUNTER — HOSPITAL ENCOUNTER (OUTPATIENT)
Dept: RADIOLOGY | Facility: HOSPITAL | Age: 39
Discharge: HOME | End: 2019-07-17
Attending: NURSE PRACTITIONER
Payer: COMMERCIAL

## 2019-07-17 DIAGNOSIS — K21.9 GASTROESOPHAGEAL REFLUX DISEASE WITHOUT ESOPHAGITIS: ICD-10-CM

## 2019-07-17 DIAGNOSIS — R13.14 PHARYNGOESOPHAGEAL DYSPHAGIA: ICD-10-CM

## 2019-07-17 PROCEDURE — 25500020 PHARM REV CODE 255

## 2019-07-17 PROCEDURE — A9698 NON-RAD CONTRAST MATERIALNOC: HCPCS | Performed by: NURSE PRACTITIONER

## 2019-07-17 PROCEDURE — 25500020 PHARM REV CODE 255: Performed by: NURSE PRACTITIONER

## 2019-07-17 PROCEDURE — 74220 FL ESOPHAGRAM COMPLETE: ICD-10-PCS | Mod: 26,,, | Performed by: RADIOLOGY

## 2019-07-17 PROCEDURE — 74220 X-RAY XM ESOPHAGUS 1CNTRST: CPT | Mod: TC

## 2019-07-17 PROCEDURE — 74220 X-RAY XM ESOPHAGUS 1CNTRST: CPT | Mod: 26,,, | Performed by: RADIOLOGY

## 2019-07-17 PROCEDURE — A9698 NON-RAD CONTRAST MATERIALNOC: HCPCS

## 2019-07-17 RX ADMIN — Medication 135 ML: at 08:07

## 2020-01-03 ENCOUNTER — HOSPITAL ENCOUNTER (EMERGENCY)
Facility: HOSPITAL | Age: 40
Discharge: HOME | End: 2020-01-03
Attending: EMERGENCY MEDICINE
Payer: COMMERCIAL

## 2020-01-03 VITALS
BODY MASS INDEX: 31.91 KG/M2 | RESPIRATION RATE: 16 BRPM | DIASTOLIC BLOOD PRESSURE: 86 MMHG | HEART RATE: 82 BPM | WEIGHT: 169 LBS | OXYGEN SATURATION: 96 % | SYSTOLIC BLOOD PRESSURE: 149 MMHG | TEMPERATURE: 99 F | HEIGHT: 61 IN

## 2020-01-03 DIAGNOSIS — S39.012A STRAIN OF LUMBAR REGION, INITIAL ENCOUNTER: Primary | ICD-10-CM

## 2020-01-03 PROCEDURE — 99284 EMERGENCY DEPT VISIT MOD MDM: CPT

## 2020-01-03 RX ORDER — METAXALONE 800 MG/1
800 TABLET ORAL 3 TIMES DAILY PRN
Qty: 15 TABLET | Refills: 0 | Status: SHIPPED | OUTPATIENT
Start: 2020-01-03 | End: 2020-01-08

## 2020-01-03 RX ORDER — NAPROXEN 375 MG/1
375 TABLET ORAL 2 TIMES DAILY WITH MEALS
Qty: 20 TABLET | Refills: 0 | Status: ON HOLD | OUTPATIENT
Start: 2020-01-03 | End: 2020-06-30 | Stop reason: CLARIF

## 2020-01-03 RX ORDER — PREDNISONE 50 MG/1
50 TABLET ORAL DAILY
Qty: 5 TABLET | Refills: 0 | Status: SHIPPED | OUTPATIENT
Start: 2020-01-03 | End: 2020-01-08

## 2020-01-03 NOTE — ED PROVIDER NOTES
Encounter Date: 1/3/2020       History     Chief Complaint   Patient presents with    Back Pain     lower back pain that began wednesday     39 year old female with complaint of lower back pain X 3 days.  No fall.  No trauma.  Reports that she bent over to  something on the floor and felt sudden pain.  Moderate pain.  Worse with movement and walking.  No loss of bowel or bladder function.         Review of patient's allergies indicates:   Allergen Reactions    Sulfur Swelling     Past Medical History:   Diagnosis Date    Anxiety     Esophageal stricture     GERD (gastroesophageal reflux disease)     Migraine headache      Past Surgical History:   Procedure Laterality Date    ESOPHAGEAL DILATION      ESOPHAGOGASTRODUODENOSCOPY       Family History   Problem Relation Age of Onset    Diabetes Father     Kidney disease Father     Lymphoma Mother     Colon cancer Neg Hx     Colon polyps Neg Hx     Esophageal cancer Neg Hx     Irritable bowel syndrome Neg Hx     Inflammatory bowel disease Neg Hx     Stomach cancer Neg Hx      Social History     Tobacco Use    Smoking status: Never Smoker    Smokeless tobacco: Never Used   Substance Use Topics    Alcohol use: No    Drug use: No     Review of Systems   Constitutional: Negative for fever.   HENT: Negative for sore throat.    Respiratory: Negative for shortness of breath.    Cardiovascular: Negative for chest pain.   Gastrointestinal: Negative for nausea.   Genitourinary: Negative for dysuria.   Musculoskeletal: Positive for back pain.   Skin: Negative for rash.   Neurological: Negative for weakness.   Hematological: Does not bruise/bleed easily.       Physical Exam     Initial Vitals [01/03/20 0802]   BP Pulse Resp Temp SpO2   (!) 149/86 82 16 98.8 °F (37.1 °C) 96 %      MAP       --         Physical Exam    Nursing note and vitals reviewed.  Constitutional: She appears well-developed and well-nourished.   HENT:   Head: Normocephalic and  atraumatic.   Eyes: Conjunctivae and EOM are normal. Pupils are equal, round, and reactive to light.   Neck: Normal range of motion. Neck supple.   Cardiovascular: Normal rate, regular rhythm, normal heart sounds and intact distal pulses.   Pulmonary/Chest: Breath sounds normal.   Abdominal: Soft. There is no tenderness. There is no rebound and no guarding.   Musculoskeletal: Normal range of motion.   No spine tenderness, left lateral lumbar tenderness, pain with ROM of lumbar spine, straight leg negative   Neurological: She is alert and oriented to person, place, and time. She has normal strength and normal reflexes.   Skin: Skin is warm and dry.   Psychiatric: She has a normal mood and affect. Her behavior is normal. Thought content normal.         ED Course   Procedures  Labs Reviewed - No data to display       Imaging Results    None                                          Clinical Impression:       ICD-10-CM ICD-9-CM   1. Strain of lumbar region, initial encounter S39.012A 847.2                             Jono Buitrago NP  01/03/20 0811

## 2020-02-12 ENCOUNTER — HOSPITAL ENCOUNTER (EMERGENCY)
Facility: HOSPITAL | Age: 40
Discharge: HOME | End: 2020-02-12
Attending: FAMILY MEDICINE
Payer: COMMERCIAL

## 2020-02-12 VITALS
DIASTOLIC BLOOD PRESSURE: 86 MMHG | HEIGHT: 61 IN | TEMPERATURE: 99 F | OXYGEN SATURATION: 97 % | RESPIRATION RATE: 18 BRPM | WEIGHT: 172.81 LBS | SYSTOLIC BLOOD PRESSURE: 145 MMHG | HEART RATE: 95 BPM | BODY MASS INDEX: 32.63 KG/M2

## 2020-02-12 DIAGNOSIS — I10 HYPERTENSION, UNSPECIFIED TYPE: Primary | ICD-10-CM

## 2020-02-12 PROCEDURE — 99283 EMERGENCY DEPT VISIT LOW MDM: CPT

## 2020-02-12 RX ORDER — AMLODIPINE BESYLATE 5 MG/1
5 TABLET ORAL DAILY
Qty: 30 TABLET | Refills: 0 | Status: SHIPPED | OUTPATIENT
Start: 2020-02-12 | End: 2020-07-17

## 2020-02-13 NOTE — ED PROVIDER NOTES
SCRIBE #1 NOTE: I, Syeda Raphael, am scribing for, and in the presence of, Carlton Gaitan NP. I have scribed the entire note.       History     Chief Complaint   Patient presents with    Hypertension     States last time she was in ER she was told she may need to be put on medications.  States not feeling well for a while.     Review of patient's allergies indicates:   Allergen Reactions    Sulfur Swelling         History of Present Illness     HPI    2/12/2020, 7:44 PM  History obtained from the patient      History of Present Illness: Renee Ruelas is a 39 y.o. female patient with a h/o anxiety, esophageal stricture, GERD, who presents to the Emergency Department for evaluation of HTN which onset past few weeks. Pt states last time she was in the ED she was told she may need to be put on medications for high BP. She states there is no chance of her being pregnant. Symptoms are constant and moderate in severity. No mitigating or exacerbating factors reported. No associated sxs reported. Patient denies any fever, cough, CP, n/v, HA, dysuria, and all other sxs at this time. No prior Tx reported. No further complaints or concerns at this time.       Arrival mode: Personal transportation      PCP: Primary Doctor No      Past Medical History:  Past Medical History:   Diagnosis Date    Anxiety     Esophageal stricture     GERD (gastroesophageal reflux disease)     Migraine headache        Past Surgical History:  Past Surgical History:   Procedure Laterality Date    ESOPHAGEAL DILATION      ESOPHAGOGASTRODUODENOSCOPY           Family History:  Family History   Problem Relation Age of Onset    Diabetes Father     Kidney disease Father     Lymphoma Mother     Colon cancer Neg Hx     Colon polyps Neg Hx     Esophageal cancer Neg Hx     Irritable bowel syndrome Neg Hx     Inflammatory bowel disease Neg Hx     Stomach cancer Neg Hx        Social History:   Social History     Tobacco Use     "Smoking status: Never Smoker    Smokeless tobacco: Never Used   Substance and Sexual Activity    Alcohol use: No    Drug use: No    Sexual activity: Yes     Partners: Male        Review of Systems     Review of Systems   Constitutional: Negative for fever.   Respiratory: Negative for cough.    Cardiovascular: Negative for chest pain.   Gastrointestinal: Negative for nausea and vomiting.   Genitourinary: Negative for dysuria.   Neurological: Negative for headaches.   Hematological:        (+) HTN   All other systems reviewed and are negative.       Physical Exam     Initial Vitals [02/12/20 1927]   BP Pulse Resp Temp SpO2   (!) 145/86 95 18 98.5 °F (36.9 °C) 97 %      MAP       --          Physical Exam  Nursing Notes and Vital Signs Reviewed.  Constitutional: Well-developed and well-nourished.   Head: Atraumatic. Normocephalic.  Eyes: EOM intact. No scleral icterus.  ENT: Mucous membranes are moist. Oropharynx is clear and symmetric.    Neck: Supple. Full ROM. No lymphadenopathy.  Cardiovascular: Regular rate. Regular rhythm. No murmurs, rubs, or gallops. Distal pulses are 2+ and symmetric.  Pulmonary/Chest: No respiratory distress. Clear to auscultation bilaterally. No wheezing or rales.  Abdominal: Soft and non-distended.  There is no tenderness.  No rebound, guarding, or rigidity. Good bowel sounds.  Genitourinary: No CVA tenderness  Musculoskeletal: Moves all extremities. No obvious deformities. No calf tenderness.  Skin: Warm and dry.  Neurological:  Alert, awake, and appropriate.  Normal speech.  No acute focal neurological deficits are appreciated.  Psychiatric: Normal affect. Good eye contact. Appropriate in content.     ED Course   Procedures  ED Vital Signs:  Vitals:    02/12/20 1927   BP: (!) 145/86   Pulse: 95   Resp: 18   Temp: 98.5 °F (36.9 °C)   TempSrc: Oral   SpO2: 97%   Weight: 78.4 kg (172 lb 13.5 oz)   Height: 5' 1" (1.549 m)       Abnormal Lab Results:  Labs Reviewed - No data to display "       The Emergency Provider reviewed the vital signs and test results, which are outlined above.     ED Discussion     7:53 PM: Reassessed pt at this time.  Pt is hypertensive for this visit. Pt states her condition has improved at this time. Pt requests to be put on high blood pressure medication and agrees to follow-up with her PCP. Discussed with pt all pertinent ED information and results. Discussed pt dx and plan of tx. Gave pt all f/u and return to the ED instructions. All questions and concerns were addressed at this time. Pt expresses understanding of information and instructions, and is comfortable with plan to discharge. Pt is stable for discharge.    I discussed with patient and/or family/caretaker that evaluation in the ED does not suggest any emergent or life threatening medical conditions requiring immediate intervention beyond what was provided in the ED, and I believe patient is safe for discharge.  Regardless, an unremarkable evaluation in the ED does not preclude the development or presence of a serious of life threatening condition. As such, patient was instructed to return immediately for any worsening or change in current symptoms.         [facility]                  ED Medication(s):  Medications - No data to display    Discharge Medication List as of 2/12/2020  7:54 PM      START taking these medications    Details   amLODIPine (NORVASC) 5 MG tablet Take 1 tablet (5 mg total) by mouth once daily., Starting Wed 2/12/2020, Until Thu 2/11/2021, Print             Follow-up Information     pcp of choice.                     Scribe Attestation:   Scribe #1: I performed the above scribed service and the documentation accurately describes the services I performed. I attest to the accuracy of the note.     Attending:   Physician Attestation Statement for Scribe #1: I, Carlton Gaitan NP, personally performed the services described in this documentation, as scribed by Syeda Raphael, in my presence, and  it is both accurate and complete.           Clinical Impression       ICD-10-CM ICD-9-CM   1. Hypertension, unspecified type I10 401.9       Disposition:   Disposition: Discharged  Condition: Stable         Carlton Gaitan NP  02/12/20 0825

## 2020-05-27 LAB
CHOLEST SERPL-MSCNC: 223 MG/DL (ref 0–200)
HDLC SERPL-MCNC: 75 MG/DL
LDLC SERPL CALC-MCNC: 131 MG/DL (ref 0–160)
TRIGL SERPL-MCNC: 85 MG/DL
TSH SERPL DL<=0.005 MIU/L-ACNC: 2.93 UIU/ML (ref 0.41–5.9)
VLDLC SERPL-MCNC: 17 MG/DL

## 2020-06-20 ENCOUNTER — HOSPITAL ENCOUNTER (EMERGENCY)
Facility: HOSPITAL | Age: 40
Discharge: HOME | End: 2020-06-20
Attending: EMERGENCY MEDICINE
Payer: COMMERCIAL

## 2020-06-20 VITALS
HEART RATE: 95 BPM | BODY MASS INDEX: 32.66 KG/M2 | OXYGEN SATURATION: 97 % | SYSTOLIC BLOOD PRESSURE: 140 MMHG | RESPIRATION RATE: 18 BRPM | WEIGHT: 173 LBS | HEIGHT: 61 IN | DIASTOLIC BLOOD PRESSURE: 100 MMHG | TEMPERATURE: 98 F

## 2020-06-20 DIAGNOSIS — R10.13 DYSPEPSIA: Primary | ICD-10-CM

## 2020-06-20 DIAGNOSIS — R13.14 PHARYNGOESOPHAGEAL DYSPHAGIA: ICD-10-CM

## 2020-06-20 DIAGNOSIS — K21.9 GASTROESOPHAGEAL REFLUX DISEASE WITHOUT ESOPHAGITIS: ICD-10-CM

## 2020-06-20 DIAGNOSIS — R07.89 CHEST DISCOMFORT: ICD-10-CM

## 2020-06-20 DIAGNOSIS — R07.9 CHEST PAIN: ICD-10-CM

## 2020-06-20 PROCEDURE — 93010 ELECTROCARDIOGRAM REPORT: CPT | Mod: ,,, | Performed by: INTERNAL MEDICINE

## 2020-06-20 PROCEDURE — 25000003 PHARM REV CODE 250: Performed by: PHYSICIAN ASSISTANT

## 2020-06-20 PROCEDURE — 99285 EMERGENCY DEPT VISIT HI MDM: CPT | Mod: ,,, | Performed by: PHYSICIAN ASSISTANT

## 2020-06-20 PROCEDURE — 93005 ELECTROCARDIOGRAM TRACING: CPT

## 2020-06-20 PROCEDURE — 93010 EKG 12-LEAD: ICD-10-PCS | Mod: ,,, | Performed by: INTERNAL MEDICINE

## 2020-06-20 PROCEDURE — 99285 PR EMERGENCY DEPT VISIT,LEVEL V: ICD-10-PCS | Mod: ,,, | Performed by: PHYSICIAN ASSISTANT

## 2020-06-20 PROCEDURE — 99284 EMERGENCY DEPT VISIT MOD MDM: CPT | Mod: 25

## 2020-06-20 RX ORDER — FAMOTIDINE 20 MG/1
20 TABLET, FILM COATED ORAL DAILY
Qty: 30 TABLET | Refills: 0 | Status: ON HOLD | OUTPATIENT
Start: 2020-06-20 | End: 2020-06-30 | Stop reason: CLARIF

## 2020-06-20 RX ORDER — PANTOPRAZOLE SODIUM 40 MG/1
40 TABLET, DELAYED RELEASE ORAL 2 TIMES DAILY
Qty: 60 TABLET | Refills: 2 | Status: SHIPPED | OUTPATIENT
Start: 2020-06-20 | End: 2020-11-18 | Stop reason: CLARIF

## 2020-06-20 RX ORDER — MAG HYDROX/ALUMINUM HYD/SIMETH 200-200-20
30 SUSPENSION, ORAL (FINAL DOSE FORM) ORAL
Status: COMPLETED | OUTPATIENT
Start: 2020-06-20 | End: 2020-06-20

## 2020-06-20 RX ORDER — MAG HYDROX/ALUMINUM HYD/SIMETH 200-200-20
30 SUSPENSION, ORAL (FINAL DOSE FORM) ORAL 3 TIMES DAILY PRN
Qty: 354 ML | Refills: 0 | Status: SHIPPED | OUTPATIENT
Start: 2020-06-20 | End: 2020-11-18 | Stop reason: CLARIF

## 2020-06-20 RX ADMIN — ALUMINUM HYDROXIDE, MAGNESIUM HYDROXIDE, AND SIMETHICONE 30 ML: 200; 200; 20 SUSPENSION ORAL at 10:06

## 2020-06-21 NOTE — ED NOTES
"Pt states, "I suffer from acid reflux and the past couple days my food goes down robyn slow and my chest hurts when I eat and then I get short of breath when I swallow until the food goes down. But like I said I suffer from acid reflux." pt reports intermittent left anterior chest pain since approximately 1400 today. No acute distress noted. Stable condition.    "

## 2020-06-21 NOTE — DISCHARGE INSTRUCTIONS
Take new medications and follow up to GI clinic. Return to ED if you develop different or worsening chest pain or shortness of breath.    Our goal in the emergency department is to always give you outstanding care and exceptional service. You may receive a survey by mail or e-mail in the next week regarding your experience in our ED. We would greatly appreciate your completing and returning the survey. Your feedback provides us with a way to recognize our staff who give very good care and it helps us learn how to improve when your experience was below our aspiration of excellence.

## 2020-06-21 NOTE — ED PROVIDER NOTES
"Encounter Date: 6/20/2020       History     Chief Complaint   Patient presents with    Chest Pain     "chest pain every time I eat, and it feels like it gets stuck," x3 days, hx of gerd     Ms Ruelas is a 39yoF who presents for chest discomfort after eating; pertinent PMHx GERD, h/o esophageal strictures, anxiety. Patient has h/o dyspepsia and food bolus sensation in chest; evaluated by GI last year and had symptomatic improvement with PPI/H2 therapy. She reports "falling off" these medications recently, and has stopped taking her PPI altogether over the past week. She is on no medications for her GERD. Since discontinuing, she reports sensation of central retrosternal chest discomfort while eating, states "the food feels like it just sits there for awhile". Symptoms eventually improve- no N/V, choking nor coughing while eating. Also reports concurrent gnawing/burning sensation in chest that is worse at night and with laying flat. Reports mild symptoms at this time, though states "not bad since I haven't eaten anything recently". Symptoms are the same as last year.  Denies SOB, other CP, dizziness, weakness, N/V/C/D, melenic stool, fever/chills.  The patients available PMH, PSH, Social History, medications, allergies, and triage vital signs were reviewed just prior to their medical evaluation.  A ten point review of systems was completed and is negative except as documented above.  Patient denies any other acute medical complaint.    Please be advised this text was dictated with [employer] software and may contain errors due to translation.           Review of patient's allergies indicates:   Allergen Reactions    Sulfur Swelling     Past Medical History:   Diagnosis Date    Anxiety     Esophageal stricture     GERD (gastroesophageal reflux disease)     Migraine headache      Past Surgical History:   Procedure Laterality Date    ESOPHAGEAL DILATION      ESOPHAGOGASTRODUODENOSCOPY       Family History "   Problem Relation Age of Onset    Diabetes Father     Kidney disease Father     Lymphoma Mother     Colon cancer Neg Hx     Colon polyps Neg Hx     Esophageal cancer Neg Hx     Irritable bowel syndrome Neg Hx     Inflammatory bowel disease Neg Hx     Stomach cancer Neg Hx      Social History     Tobacco Use    Smoking status: Never Smoker    Smokeless tobacco: Never Used   Substance Use Topics    Alcohol use: No    Drug use: No     Review of Systems   Constitutional: Negative for chills and fever.   HENT: Trouble swallowing: slowed esophageal emptying.    Respiratory: Negative for cough and shortness of breath.    Cardiovascular: Chest pain: burning, gnawing at bottom of chest worsens at night and with position.   Gastrointestinal: Negative for abdominal pain, constipation, diarrhea, nausea and vomiting.   Musculoskeletal: Negative for back pain and neck pain.   Skin: Negative for pallor and rash.   Neurological: Negative for dizziness, weakness and headaches.   Psychiatric/Behavioral: Negative for confusion.       Physical Exam     Initial Vitals [06/20/20 2145]   BP Pulse Resp Temp SpO2   (!) 140/100 95 18 98 °F (36.7 °C) 97 %      MAP       --         Physical Exam    Vitals reviewed.  Constitutional: She appears well-developed and well-nourished. She is not diaphoretic. No distress.   HENT:   Head: Normocephalic and atraumatic.   Right Ear: External ear normal.   Left Ear: External ear normal.   Eyes: Conjunctivae and EOM are normal. Pupils are equal, round, and reactive to light. No scleral icterus.   Cardiovascular: Normal rate, regular rhythm and intact distal pulses.   Pulmonary/Chest: Breath sounds normal. No stridor. She has no wheezes. She has no rhonchi. She has no rales.   Abdominal: Soft. Bowel sounds are normal. There is no abdominal tenderness. There is no rebound and no guarding.   Musculoskeletal: No edema.   Neurological: She is alert and oriented to person, place, and time.   Skin:  Skin is warm and dry. Capillary refill takes less than 2 seconds.   Psychiatric: She has a normal mood and affect. Her behavior is normal. Judgment and thought content normal.         ED Course   Procedures  Labs Reviewed - No data to display       Imaging Results          X-Ray Chest PA And Lateral (Final result)  Result time 06/20/20 23:31:07    Final result by Louie Carlin MD (06/20/20 23:31:07)                 Impression:      No acute cardiopulmonary process.      Electronically signed by: Louie Carlin MD  Date:    06/20/2020  Time:    23:31             Narrative:    EXAMINATION:  XR CHEST PA AND LATERAL    CLINICAL HISTORY:  Epigastric pain    TECHNIQUE:  PA and lateral views of the chest were performed.    COMPARISON:  November 12, 2014.    FINDINGS:  There is no consolidation, effusion, or pneumothorax.    Cardiomediastinal silhouette is unremarkable.    Regional osseous structures are unremarkable.                                 Medical Decision Making:   History:   Old Medical Records: I decided to obtain old medical records.  Old Records Summarized: records from clinic visits and records from previous admission(s).  Initial Assessment:   Patient presents with recurrence of dyspepsia, delayed esophageal emptying since being off PPI/H2 therapies. VSS, afebrile, no PO intolerability  Differential Diagnosis:   DDx includes dyspepsia/GERD, esophageal motility disorder, esophagitis, anxiety. Physical exam and history taking lower clinical suspicion for ACS, PE, PNA, foreign body, acute abdomen.   Independently Interpreted Test(s):   I have ordered and independently interpreted X-rays - see prior notes.  I have ordered and independently interpreted EKG Reading(s) - see prior notes  Clinical Tests:   Radiological Study: Ordered and Reviewed  Medical Tests: Ordered and Reviewed  ED Management:  EKG with nonspp repol disturbance in leads III, V3-V5, unchanged from prior. CXR without acute findings.  Clinically, based on history/PMHx and exam, strongly suspect GI etiology of symptoms. Will restart on PPI/H2 therapy with return to GI specialty. Patient agreed to plan of care and voiced understanding. Discharged in stable condition with strict ED return precautions.    Racheal Roy PA-C  06/20/2020    I discussed the following case, diagnosis and plan of care with attending physician.      Additional MDM:   PERC Rule:   Age is greater than or equal to 50 = 0.0  Heart Rate is greater than or equal to 100 = 0.0  SaO2 on room air < 95% = 0.0  Unilateral leg swelling = 0.0  Hemoptysis = 0.0  Recent surgery or trauma = 0.0  Prior PE or DVT =  0.0  Hormone use = 0.00  PERC Score = 0                              Clinical Impression:       ICD-10-CM ICD-9-CM   1. Dyspepsia  R10.13 536.8   2. Chest pain  R07.9 786.50   3. Chest discomfort  R07.89 786.59   4. Pharyngoesophageal dysphagia  R13.14 787.24   5. Gastroesophageal reflux disease without esophagitis  K21.9 530.81         Disposition:   Disposition: Discharged  Condition: Stable     ED Disposition Condition    Discharge Stable        ED Prescriptions     Medication Sig Dispense Start Date End Date Auth. Provider    pantoprazole (PROTONIX) 40 MG tablet Take 1 tablet (40 mg total) by mouth 2 (two) times daily. 60 tablet 6/20/2020  Racheal Roy PA-C    famotidine (PEPCID) 20 MG tablet Take 1 tablet (20 mg total) by mouth once daily. for 30 doses 30 tablet 6/20/2020 7/20/2020 Racheal Roy PA-C    aluminum-magnesium hydroxide-simethicone (MAALOX) 200-200-20 mg/5 mL Susp Take 30 mLs by mouth 3 (three) times daily as needed. 354 mL 6/20/2020 6/20/2021 Racheal Roy PA-C        Follow-up Information     Follow up With Specialties Details Why Contact Info Additional Information    [facility] Emergency Medicine Go to  Return to the ER immediately, If symptoms worsen or new symptoms occur [address] [facility]  [address]  [phone]     [facility] G[facility]ology   [address]  [facility] [address]  [phone] [facility] - 4th Floor                                     Racheal Roy PA-C  06/20/20 8091

## 2020-06-24 ENCOUNTER — OFFICE VISIT (OUTPATIENT)
Dept: GASTROENTEROLOGY | Facility: CLINIC | Age: 40
End: 2020-06-24
Payer: COMMERCIAL

## 2020-06-24 VITALS — HEIGHT: 61 IN | WEIGHT: 172.63 LBS | BODY MASS INDEX: 32.59 KG/M2

## 2020-06-24 DIAGNOSIS — R10.13 DYSPEPSIA: ICD-10-CM

## 2020-06-24 PROCEDURE — 3008F PR BODY MASS INDEX (BMI) DOCUMENTED: ICD-10-PCS | Mod: CPTII,S$GLB,, | Performed by: INTERNAL MEDICINE

## 2020-06-24 PROCEDURE — 99999 PR PBB SHADOW E&M-EST. PATIENT-LVL III: ICD-10-PCS | Mod: PBBFAC,,, | Performed by: INTERNAL MEDICINE

## 2020-06-24 PROCEDURE — 3008F BODY MASS INDEX DOCD: CPT | Mod: CPTII,S$GLB,, | Performed by: INTERNAL MEDICINE

## 2020-06-24 PROCEDURE — 99999 PR PBB SHADOW E&M-EST. PATIENT-LVL III: CPT | Mod: PBBFAC,,, | Performed by: INTERNAL MEDICINE

## 2020-06-24 PROCEDURE — 99214 OFFICE O/P EST MOD 30 MIN: CPT | Mod: S$GLB,,, | Performed by: INTERNAL MEDICINE

## 2020-06-24 PROCEDURE — 99214 PR OFFICE/OUTPT VISIT, EST, LEVL IV, 30-39 MIN: ICD-10-PCS | Mod: S$GLB,,, | Performed by: INTERNAL MEDICINE

## 2020-06-24 RX ORDER — SUCRALFATE 1 G/10ML
1 SUSPENSION ORAL 4 TIMES DAILY
Qty: 414 ML | Refills: 0 | Status: SHIPPED | OUTPATIENT
Start: 2020-06-24 | End: 2020-11-25

## 2020-06-24 NOTE — LETTER
June 25, 2020      Racheal Roy PA-C  [address]  [facility] [address]           [place] - Gastroenterology  [address]  [facility] [address]  Phone: [phone]          Patient: Renee Ruelas   MR Number: [id number]   YOB: 1980   Date of Visit: 6/24/2020       Dear Racheal Roy:    Thank you for referring Renee Ruelas to me for evaluation. Attached you will find relevant portions of my assessment and plan of care.    If you have questions, please do not hesitate to call me. I look forward to following Renee Ruelas along with you.    Sincerely,    Kishor Cordero MD    Enclosure  CC:  No Recipients    If you would like to receive this communication electronically, please contact [email or web address] or [phone] to request more information on [employer] access.    For providers and/or their staff who would like to refer a patient to [facility], please contact us through our one-stop-shop provider referral line, [facility] , at [phone].    If you feel you have received this communication in error or would no longer like to receive these types of communications, please e-mail [email or web address]

## 2020-06-25 NOTE — PROGRESS NOTES
Subjective:       Patient ID: Renee Ruelas is a 39 y.o. female.    Chief Complaint: Gastroesophageal Reflux (for about week )    This is a 39-year-old female for evaluation of reflux symptoms.  She has noted worsening substernal burning for the past several weeks which is moderate intensity and without particular dietary relation.  She has been taking PPI therapy with incomplete relief of her symptoms.  She denies dysphagia or odynophagia.  Some with globus.  Symptoms are daily, mild-to-moderate intensity but had does have short spells which are more severe.  No vomiting, nausea.  No dark urine, jaundice, weight loss.  Endoscopic evaluation in 2017 noted a normal esophagus.  A subsequent esophagram noted evidence of spasm and reflux.    The following portions of the patient's history were reviewed and updated as appropriate: allergies, current medications, past family history, past medical history, past social history, past surgical history and problem list.    (Portions of this note were dictated using voice recognition software and may contain dictation related errors in spelling/grammar/syntax not found on text review)  HPI  Review of Systems   Constitutional: Negative for fatigue and unexpected weight change.   Respiratory: Negative for shortness of breath and wheezing.    Cardiovascular: Negative for chest pain and palpitations.   Gastrointestinal: Negative for abdominal distention and abdominal pain.         Objective:      Physical Exam  Vitals signs and nursing note reviewed.   Constitutional:       General: She is not in acute distress.     Appearance: Normal appearance. She is well-developed. She is not diaphoretic.   HENT:      Head: Normocephalic and atraumatic.   Eyes:      General: No scleral icterus.     Conjunctiva/sclera: Conjunctivae normal.   Neck:      Musculoskeletal: Normal range of motion and neck supple.      Thyroid: No thyromegaly.      Trachea: No tracheal deviation.    Cardiovascular:      Rate and Rhythm: Normal rate and regular rhythm.      Heart sounds: No murmur. No friction rub. No gallop.    Pulmonary:      Effort: Pulmonary effort is normal. No respiratory distress.      Breath sounds: Normal breath sounds. No wheezing.   Abdominal:      General: Bowel sounds are normal. There is no distension.      Palpations: Abdomen is soft.      Tenderness: There is no abdominal tenderness.   Musculoskeletal:      Right wrist: She exhibits normal range of motion and no tenderness.      Left wrist: She exhibits normal range of motion and no tenderness.   Lymphadenopathy:      Head:      Right side of head: No submental or submandibular adenopathy.      Left side of head: No submental or submandibular adenopathy.   Skin:     General: Skin is warm and dry.      Findings: No erythema or rash.   Neurological:      General: No focal deficit present.      Mental Status: She is alert and oriented to person, place, and time.   Psychiatric:         Mood and Affect: Mood normal.         Behavior: Behavior normal.           Labs/imaging; reviewed  Assessment:       1. Dyspepsia        Plan:   1. Trial of carafate  2. Pt to message with symptom update in 1-2 weeks, if no improvement can proceed with egd

## 2020-06-26 ENCOUNTER — TELEPHONE (OUTPATIENT)
Dept: GASTROENTEROLOGY | Facility: CLINIC | Age: 40
End: 2020-06-26

## 2020-06-26 DIAGNOSIS — Z01.812 PRE-PROCEDURE LAB EXAM: ICD-10-CM

## 2020-06-26 DIAGNOSIS — R10.9 ABDOMINAL PAIN, UNSPECIFIED ABDOMINAL LOCATION: Primary | ICD-10-CM

## 2020-06-26 NOTE — TELEPHONE ENCOUNTER
Pt states every time she eats she gets shortness of breath, even drinking water. She is not choking but every time she eats she starts coughing and feels as if she needs to choke. Pt is eating lite meals. Pt would like to know what to do.   Pt is taking carafate but is not seeing a difference as of yet. Advised pt if she gets any worse or os worried about shortness of breath and choking she could always go to the ED

## 2020-06-26 NOTE — TELEPHONE ENCOUNTER
----- Message from Christine Rojas sent at 6/26/2020 12:18 PM CDT -----  Contact: patient  Patient called to speak with a nurse concerning her condition.    She would like a callback at [phone]    Thanks  KB

## 2020-06-29 ENCOUNTER — TELEPHONE (OUTPATIENT)
Dept: ENDOSCOPY | Facility: HOSPITAL | Age: 40
End: 2020-06-29

## 2020-06-29 NOTE — TELEPHONE ENCOUNTER
Spoke with patient about arrival time @ 1230     NPO status reviewed: Patient must have nothing to eat after midnight.  Pt may have CLEAR liquids ONLY until completely NPO 3 hrs @ 930*    Medications: Do not take Insulin or oral diabetic medications the day of the procedure.  Take as prescribed: heart, seizure and blood pressure medication in the morning with a sip of water (less than an ounce).  Take any breathing medications and bring inhalers to hospital with you Leave all valuables and jewelry at home.     Wear comfortable clothes to procedure to change into hospital gown You cannot drive for 24 hours after your procedure because you will receive sedation for your procedure to make you comfortable.  A ride must be provided at discharge.                Pt to be rapid tested.

## 2020-06-29 NOTE — ADDENDUM NOTE
Addended by: MONIKA HAQUE on: 6/29/2020 08:48 AM     Modules accepted: Orders     I personally performed the service described in the documentation recorded by the scribe in my presence, and it accurately and completely records my words and actions.

## 2020-06-29 NOTE — TELEPHONE ENCOUNTER
Per Dr. Cordero: ordered EGD      Endoscopy Scheduling Questionnaire:     1. Have you been admitted overnight to the hospital in the past 3 months? no  2. Have you had a cardiac stent placed in the past 12 months? no  3. Have you had a stroke or heart attack in the past 6 months? no  4. Have you had any chest pain in the past 3 months?       If so, have you been evaluated by your PCP or Cardiologist? Yes, with acid reflux  5. Do you take any blood thinners? no  6. Have you been diagnosed with Diverticulitis within the past 3 months? no  7. Are you on dialysis or have Kidney Disease?no   8. Are you diabetic?  Do you have an insulin pump? No, no  9. Do you have any other health issues that you feel might limit your ability to safely have the procedure and/or sedation? no  10. Is the patient over 79 yo?      If yes, has the patient been seen by their PCP or GI in the last 6 months? no  11. Family History of Colon Cancer? no         If yes, relationship/age?  12. Previous Colonoscopy Procedure? no         If yes, when/where  13. History of Colon Cancer? no  14.  History of Colon Polyps? no  15. Have you had a FIT Test in the last year? no                 -I have reviewed the patient's medications and allergies.       is not on high risk medication,   A Medication /Cardiac Clearance request  has been sent to **  -I have verified the pharmacy information. The prep being used is **. The patient's prep instructions were sent by **

## 2020-06-30 ENCOUNTER — ANESTHESIA (OUTPATIENT)
Dept: ENDOSCOPY | Facility: HOSPITAL | Age: 40
End: 2020-06-30
Payer: COMMERCIAL

## 2020-06-30 ENCOUNTER — ANESTHESIA EVENT (OUTPATIENT)
Dept: ENDOSCOPY | Facility: HOSPITAL | Age: 40
End: 2020-06-30
Payer: COMMERCIAL

## 2020-06-30 ENCOUNTER — HOSPITAL ENCOUNTER (OUTPATIENT)
Facility: HOSPITAL | Age: 40
Discharge: HOME | End: 2020-06-30
Attending: INTERNAL MEDICINE | Admitting: INTERNAL MEDICINE
Payer: COMMERCIAL

## 2020-06-30 VITALS
TEMPERATURE: 98 F | SYSTOLIC BLOOD PRESSURE: 125 MMHG | BODY MASS INDEX: 32.47 KG/M2 | OXYGEN SATURATION: 98 % | DIASTOLIC BLOOD PRESSURE: 57 MMHG | HEART RATE: 83 BPM | RESPIRATION RATE: 20 BRPM | HEIGHT: 61 IN | WEIGHT: 172 LBS

## 2020-06-30 DIAGNOSIS — R10.13 DYSPEPSIA: ICD-10-CM

## 2020-06-30 LAB
B-HCG UR QL: NEGATIVE
CTP QC/QA: YES
SARS-COV-2 RDRP RESP QL NAA+PROBE: NEGATIVE

## 2020-06-30 PROCEDURE — 88342 IMHCHEM/IMCYTCHM 1ST ANTB: CPT | Mod: 26,,, | Performed by: PATHOLOGY

## 2020-06-30 PROCEDURE — 88342 IMHCHEM/IMCYTCHM 1ST ANTB: CPT | Performed by: PATHOLOGY

## 2020-06-30 PROCEDURE — 43239 EGD BIOPSY SINGLE/MULTIPLE: CPT | Mod: ,,, | Performed by: INTERNAL MEDICINE

## 2020-06-30 PROCEDURE — 37000008 HC ANESTHESIA 1ST 15 MINUTES: Performed by: INTERNAL MEDICINE

## 2020-06-30 PROCEDURE — 88341 PR IHC OR ICC EACH ADD'L SINGLE ANTIBODY  STAINPR: ICD-10-PCS | Mod: 26,,, | Performed by: PATHOLOGY

## 2020-06-30 PROCEDURE — 63600175 PHARM REV CODE 636 W HCPCS: Performed by: NURSE ANESTHETIST, CERTIFIED REGISTERED

## 2020-06-30 PROCEDURE — 88341 IMHCHEM/IMCYTCHM EA ADD ANTB: CPT | Mod: 59 | Performed by: PATHOLOGY

## 2020-06-30 PROCEDURE — 43239 EGD BIOPSY SINGLE/MULTIPLE: CPT | Performed by: INTERNAL MEDICINE

## 2020-06-30 PROCEDURE — 88342 CHG IMMUNOCYTOCHEMISTRY: ICD-10-PCS | Mod: 26,,, | Performed by: PATHOLOGY

## 2020-06-30 PROCEDURE — 25000003 PHARM REV CODE 250: Performed by: NURSE ANESTHETIST, CERTIFIED REGISTERED

## 2020-06-30 PROCEDURE — 88305 TISSUE EXAM BY PATHOLOGIST: CPT | Mod: 59 | Performed by: PATHOLOGY

## 2020-06-30 PROCEDURE — 25000003 PHARM REV CODE 250: Performed by: INTERNAL MEDICINE

## 2020-06-30 PROCEDURE — 81025 URINE PREGNANCY TEST: CPT | Performed by: INTERNAL MEDICINE

## 2020-06-30 PROCEDURE — 37000009 HC ANESTHESIA EA ADD 15 MINS: Performed by: INTERNAL MEDICINE

## 2020-06-30 PROCEDURE — 88341 IMHCHEM/IMCYTCHM EA ADD ANTB: CPT | Mod: 26,,, | Performed by: PATHOLOGY

## 2020-06-30 PROCEDURE — 27201012 HC FORCEPS, HOT/COLD, DISP: Performed by: INTERNAL MEDICINE

## 2020-06-30 PROCEDURE — 43239 PR EGD, FLEX, W/BIOPSY, SGL/MULTI: ICD-10-PCS | Mod: ,,, | Performed by: INTERNAL MEDICINE

## 2020-06-30 PROCEDURE — U0002 COVID-19 LAB TEST NON-CDC: HCPCS

## 2020-06-30 PROCEDURE — 88305 TISSUE EXAM BY PATHOLOGIST: ICD-10-PCS | Mod: 26,,, | Performed by: PATHOLOGY

## 2020-06-30 PROCEDURE — 88305 TISSUE EXAM BY PATHOLOGIST: CPT | Mod: 26,,, | Performed by: PATHOLOGY

## 2020-06-30 RX ORDER — PROPOFOL 10 MG/ML
VIAL (ML) INTRAVENOUS
Status: DISCONTINUED | OUTPATIENT
Start: 2020-06-30 | End: 2020-06-30

## 2020-06-30 RX ORDER — LIDOCAINE HYDROCHLORIDE 20 MG/ML
INJECTION INTRAVENOUS
Status: DISCONTINUED | OUTPATIENT
Start: 2020-06-30 | End: 2020-06-30

## 2020-06-30 RX ORDER — PROPOFOL 10 MG/ML
VIAL (ML) INTRAVENOUS CONTINUOUS PRN
Status: DISCONTINUED | OUTPATIENT
Start: 2020-06-30 | End: 2020-06-30

## 2020-06-30 RX ORDER — GLYCOPYRROLATE 0.2 MG/ML
INJECTION INTRAMUSCULAR; INTRAVENOUS
Status: DISCONTINUED | OUTPATIENT
Start: 2020-06-30 | End: 2020-06-30

## 2020-06-30 RX ORDER — SODIUM CHLORIDE 0.9 % (FLUSH) 0.9 %
10 SYRINGE (ML) INJECTION
Status: DISCONTINUED | OUTPATIENT
Start: 2020-06-30 | End: 2020-06-30 | Stop reason: HOSPADM

## 2020-06-30 RX ORDER — SODIUM CHLORIDE 9 MG/ML
INJECTION, SOLUTION INTRAVENOUS CONTINUOUS
Status: DISCONTINUED | OUTPATIENT
Start: 2020-06-30 | End: 2020-06-30 | Stop reason: HOSPADM

## 2020-06-30 RX ADMIN — GLYCOPYRROLATE 0.2 MG: 0.2 INJECTION, SOLUTION INTRAMUSCULAR; INTRAVENOUS at 02:06

## 2020-06-30 RX ADMIN — PROPOFOL 100 MG: 10 INJECTION, EMULSION INTRAVENOUS at 02:06

## 2020-06-30 RX ADMIN — PROPOFOL 200 MCG/KG/MIN: 10 INJECTION, EMULSION INTRAVENOUS at 02:06

## 2020-06-30 RX ADMIN — PROPOFOL 50 MG: 10 INJECTION, EMULSION INTRAVENOUS at 02:06

## 2020-06-30 RX ADMIN — SODIUM CHLORIDE: 0.9 INJECTION, SOLUTION INTRAVENOUS at 01:06

## 2020-06-30 RX ADMIN — LIDOCAINE HYDROCHLORIDE 100 MG: 20 INJECTION, SOLUTION INTRAVENOUS at 02:06

## 2020-06-30 NOTE — H&P
[facility]  Gastroenterology  H&P    Patient Name: Renee Ruelas  MRN: [id number]  Admission Date: 6/30/2020  Code Status: Full Code    Attending Provider: Kishor Cordero MD   Primary Care Physician: Jose Angel Tobar MD  Principal Problem:<principal problem not specified>    Subjective:     History of Present Illness: GERD not responding to medical therapy    Past Medical History:   Diagnosis Date    Anxiety     Esophageal stricture     GERD (gastroesophageal reflux disease)     Migraine headache        Past Surgical History:   Procedure Laterality Date    ESOPHAGEAL DILATION      ESOPHAGOGASTRODUODENOSCOPY         Review of patient's allergies indicates:   Allergen Reactions    Sulfur Swelling     Family History     Problem Relation (Age of Onset)    Diabetes Father    Kidney disease Father    Lymphoma Mother        Tobacco Use    Smoking status: Never Smoker    Smokeless tobacco: Never Used   Substance and Sexual Activity    Alcohol use: No    Drug use: No    Sexual activity: Yes     Partners: Male     Review of Systems   Constitutional: Negative for fatigue and unexpected weight change.   HENT: Negative for tinnitus and voice change.    Respiratory: Negative for shortness of breath and wheezing.    Cardiovascular: Negative for chest pain and palpitations.   Gastrointestinal: Negative for rectal pain and vomiting.     Objective:     Vital Signs (Most Recent):  Temp: 98 °F (36.7 °C) (06/30/20 1318)  Pulse: 84 (06/30/20 1318)  Resp: 18 (06/30/20 1318)  BP: 123/80 (06/30/20 1318)  SpO2: 99 % (06/30/20 1318) Vital Signs (24h Range):  Temp:  [98 °F (36.7 °C)] 98 °F (36.7 °C)  Pulse:  [84] 84  Resp:  [18] 18  SpO2:  [99 %] 99 %  BP: (123)/(80) 123/80     Weight: 78 kg (172 lb) (06/30/20 1318)  Body mass index is 32.5 kg/m².    No intake or output data in the 24 hours ending 06/30/20 1405    Lines/Drains/Airways     Peripheral Intravenous Line                 Peripheral IV - Single  Lumen 06/30/20 1319 22 G Right Forearm less than 1 day                Physical Exam  Vitals signs and nursing note reviewed.   Constitutional:       General: She is not in acute distress.     Appearance: She is well-developed. She is not diaphoretic.   HENT:      Head: Normocephalic and atraumatic.   Eyes:      General: No scleral icterus.     Conjunctiva/sclera: Conjunctivae normal.   Neck:      Musculoskeletal: Normal range of motion and neck supple.      Thyroid: No thyromegaly.      Trachea: No tracheal deviation.   Cardiovascular:      Rate and Rhythm: Normal rate and regular rhythm.      Heart sounds: No murmur. No friction rub. No gallop.    Pulmonary:      Effort: Pulmonary effort is normal. No respiratory distress.      Breath sounds: Normal breath sounds. No wheezing.   Abdominal:      General: Bowel sounds are normal. There is no distension.      Palpations: Abdomen is soft.      Tenderness: There is no abdominal tenderness.   Musculoskeletal:      Right wrist: She exhibits normal range of motion and no tenderness.      Left wrist: She exhibits normal range of motion and no tenderness.   Lymphadenopathy:      Head:      Right side of head: No submental or submandibular adenopathy.      Left side of head: No submental or submandibular adenopathy.   Skin:     General: Skin is warm and dry.      Findings: No erythema or rash.   Neurological:      Mental Status: She is alert and oriented to person, place, and time.   Psychiatric:         Behavior: Behavior normal.         Significant Labs: reviewed    Significant Imaging: reviewed    Assessment/Plan:     Active Diagnoses:    Diagnosis Date Noted POA    Dyspepsia [R10.13] 06/30/2020 Yes      Problems Resolved During this Admission:     Plan  1. EGD    Kishor Cordero MD  Gastroenterology  [facility]

## 2020-06-30 NOTE — ANESTHESIA PREPROCEDURE EVALUATION
06/30/2020  Renee Ruelas is a 39 y.o., female.    Anesthesia Evaluation       I have reviewed the Medications.     Review of Systems  Anesthesia Hx:  No problems with previous Anesthesia Denies Family Hx of Anesthesia complications.    Social:  No Alcohol Use, Non-Smoker    Pulmonary:   Sleep Apnea    Hepatic/GI:   GERD    Neurological:   Headaches        Physical Exam  General:  Well nourished    Airway/Jaw/Neck:  Airway Findings: Mouth Opening: Normal Tongue: Normal  General Airway Assessment: Adult  Mallampati: II      Dental:  Dental Findings: In tact   Chest/Lungs:  Chest/Lungs Findings: Clear to auscultation, Normal Respiratory Rate     Heart/Vascular:  Heart Findings: Rate: Normal  Rhythm: Regular Rhythm        Mental Status:  Mental Status Findings:  Cooperative, Alert and Oriented         Anesthesia Plan  Type of Anesthesia, risks & benefits discussed:  Anesthesia Type:  MAC  Patient's Preference: MAC  Intra-op Monitoring Plan:   Intra-op Monitoring Plan Comments:   Post Op Pain Control Plan:   Post Op Pain Control Plan Comments:   Induction:   IV  Beta Blocker:  Patient is not currently on a Beta-Blocker (No further documentation required).       Informed Consent: Patient understands risks and agrees with Anesthesia plan.  Questions answered. Anesthesia consent signed with patient.  ASA Score: 2     Day of Surgery Review of History & Physical:            Ready For Surgery From Anesthesia Perspective.

## 2020-06-30 NOTE — [employer] PATIENT INSTRUCTIONS
Discharge Summary/Instructions after an Endoscopic Procedure  Patient Name: Renee Dalal  Patient MRN: [id number]  Patient YOB: 1980 Tuesday, June 30, 2020  Kishor Cordero MD  Your health is very important to us during the Covid Crisis. Following your   procedure today, you will receive a daily text for 2 weeks asking about   signs or symptoms of Covid 19.  Please respond to this text when you   receive it so we can follow up and keep you as safe as possible.   RESTRICTIONS:  During your procedure today, you received medications for sedation.  These   medications may affect your judgment, balance and coordination.  Therefore,   for 24 hours, you have the following restrictions:   - DO NOT drive a car, operate machinery, make legal/financial decisions,   sign important papers or drink alcohol.    ACTIVITY:  Today: no heavy lifting, straining or running due to procedural   sedation/anesthesia.  The following day: return to full activity including work.  DIET:  Eat and drink normally unless instructed otherwise.     TREATMENT FOR COMMON SIDE EFFECTS:  - Mild abdominal pain, nausea, belching, bloating or excessive gas:  rest,   eat lightly and use a heating pad.  - Sore Throat: treat with throat lozenges and/or gargle with warm salt   water.  - Because air was used during the procedure, expelling large amounts of air   from your rectum or belching is normal.  - If a bowel prep was taken, you may not have a bowel movement for 1-3 days.    This is normal.  SYMPTOMS TO WATCH FOR AND REPORT TO YOUR PHYSICIAN:  1. Abdominal pain or bloating, other than gas cramps.  2. Chest pain.  3. Back pain.  4. Signs of infection such as: chills or fever occurring within 24 hours   after the procedure.  5. Rectal bleeding, which would show as bright red, maroon, or black stools.   (A tablespoon of blood from the rectum is not serious, especially if   hemorrhoids are present.)  6. Vomiting.  7. Weakness or dizziness.  GO  DIRECTLY TO THE NEAREST EMERGENCY ROOM IF YOU HAVE ANY OF THE FOLLOWING:      Difficulty breathing              Chills and/or fever over 101 F   Persistent vomiting and/or vomiting blood   Severe abdominal pain   Severe chest pain   Black, tarry stools   Bleeding- more than one tablespoon   Any other symptom or condition that you feel may need urgent attention  Your doctor recommends these additional instructions:  If any biopsies were taken, your doctors clinic will contact you in 1 to 2   weeks with any results.  - Discharge patient to home (via wheelchair).   - Patient has a contact number available for emergencies.  The signs and   symptoms of potential delayed complications were discussed with the   patient.  Return to normal activities tomorrow.  Written discharge   instructions were provided to the patient.   - Resume previous diet.   - Continue present medications.   - Await pathology results.   - Prior imaging suspicious for esophageal spasm. May consider medical   therapy if biopsies do not elucidate an etiology.  For questions, problems or results please call your physician - Kishor Cordero MD.  EMERGENCY PHONE NUMBER: [phone],  LAB RESULTS: [phone]  IF A COMPLICATION OR EMERGENCY SITUATION ARISES AND YOU ARE UNABLE TO REACH   YOUR PHYSICIAN - GO DIRECTLY TO THE EMERGENCY ROOM.  Kishor Cordero MD  6/30/2020 2:29:25 PM  This report has been verified and signed electronically.  [employer]

## 2020-06-30 NOTE — ANESTHESIA POSTPROCEDURE EVALUATION
Anesthesia Post Evaluation    Patient: Renee Ruelas    Procedure(s) Performed: Procedure(s) (LRB):  ESOPHAGOGASTRODUODENOSCOPY (EGD) (N/A)    Final Anesthesia Type: MAC    Patient location during evaluation: GI PACU  Patient participation: Yes- Able to Participate  Level of consciousness: awake and alert  Post-procedure vital signs: reviewed and stable  Pain management: adequate  Airway patency: patent    PONV status at discharge: No PONV  Anesthetic complications: no      Cardiovascular status: blood pressure returned to baseline and hemodynamically stable  Respiratory status: unassisted, spontaneous ventilation and room air  Hydration status: euvolemic  Follow-up not needed.          Vitals Value Taken Time   /77 06/30/20 1422   Temp 36.5 °C (97.7 °F) 06/30/20 1422   Pulse 100 06/30/20 1422   Resp 20 06/30/20 1422   SpO2 97 % 06/30/20 1422         No case tracking events are documented in the log.      Pain/Tony Score: Tony Score: 10 (6/30/2020  2:22 PM)

## 2020-07-10 LAB
COMMENT: NORMAL
FINAL PATHOLOGIC DIAGNOSIS: NORMAL
GROSS: NORMAL
Lab: NORMAL

## 2020-07-11 ENCOUNTER — HOSPITAL ENCOUNTER (EMERGENCY)
Facility: HOSPITAL | Age: 40
Discharge: HOME | End: 2020-07-11
Attending: EMERGENCY MEDICINE
Payer: COMMERCIAL

## 2020-07-11 VITALS
SYSTOLIC BLOOD PRESSURE: 120 MMHG | WEIGHT: 178.81 LBS | BODY MASS INDEX: 33.76 KG/M2 | OXYGEN SATURATION: 100 % | HEIGHT: 61 IN | HEART RATE: 96 BPM | DIASTOLIC BLOOD PRESSURE: 73 MMHG | RESPIRATION RATE: 18 BRPM | TEMPERATURE: 99 F

## 2020-07-11 DIAGNOSIS — R05.9 COUGH: ICD-10-CM

## 2020-07-11 DIAGNOSIS — R07.9 CHEST PAIN: Primary | ICD-10-CM

## 2020-07-11 LAB
ALBUMIN SERPL BCP-MCNC: 3.4 G/DL (ref 3.5–5.2)
ALP SERPL-CCNC: 44 U/L (ref 55–135)
ALT SERPL W/O P-5'-P-CCNC: 17 U/L (ref 10–44)
ANION GAP SERPL CALC-SCNC: 8 MMOL/L (ref 8–16)
ANION GAP SERPL CALC-SCNC: 8 MMOL/L (ref 8–16)
AST SERPL-CCNC: 21 U/L (ref 10–40)
BASOPHILS # BLD AUTO: 0.04 K/UL (ref 0–0.2)
BASOPHILS NFR BLD: 0.5 % (ref 0–1.9)
BILIRUB SERPL-MCNC: 0.2 MG/DL (ref 0.1–1)
BNP SERPL-MCNC: <10 PG/ML (ref 0–99)
BUN SERPL-MCNC: 10 MG/DL (ref 6–20)
BUN SERPL-MCNC: 10 MG/DL (ref 6–20)
CALCIUM SERPL-MCNC: 8.4 MG/DL (ref 8.7–10.5)
CALCIUM SERPL-MCNC: 8.4 MG/DL (ref 8.7–10.5)
CHLORIDE SERPL-SCNC: 108 MMOL/L (ref 95–110)
CHLORIDE SERPL-SCNC: 108 MMOL/L (ref 95–110)
CK SERPL-CCNC: 62 U/L (ref 20–180)
CO2 SERPL-SCNC: 22 MMOL/L (ref 23–29)
CO2 SERPL-SCNC: 22 MMOL/L (ref 23–29)
CREAT SERPL-MCNC: 0.9 MG/DL (ref 0.5–1.4)
CREAT SERPL-MCNC: 0.9 MG/DL (ref 0.5–1.4)
DIFFERENTIAL METHOD: NORMAL
EOSINOPHIL # BLD AUTO: 0.2 K/UL (ref 0–0.5)
EOSINOPHIL NFR BLD: 2 % (ref 0–8)
ERYTHROCYTE [DISTWIDTH] IN BLOOD BY AUTOMATED COUNT: 13.3 % (ref 11.5–14.5)
EST. GFR  (AFRICAN AMERICAN): >60 ML/MIN/1.73 M^2
EST. GFR  (AFRICAN AMERICAN): >60 ML/MIN/1.73 M^2
EST. GFR  (NON AFRICAN AMERICAN): >60 ML/MIN/1.73 M^2
EST. GFR  (NON AFRICAN AMERICAN): >60 ML/MIN/1.73 M^2
GLUCOSE SERPL-MCNC: 100 MG/DL (ref 70–110)
GLUCOSE SERPL-MCNC: 100 MG/DL (ref 70–110)
HCT VFR BLD AUTO: 38.2 % (ref 37–48.5)
HGB BLD-MCNC: 12.4 G/DL (ref 12–16)
IMM GRANULOCYTES # BLD AUTO: 0.04 K/UL (ref 0–0.04)
IMM GRANULOCYTES NFR BLD AUTO: 0.5 % (ref 0–0.5)
LYMPHOCYTES # BLD AUTO: 2.1 K/UL (ref 1–4.8)
LYMPHOCYTES NFR BLD: 27.6 % (ref 18–48)
MCH RBC QN AUTO: 27.3 PG (ref 27–31)
MCHC RBC AUTO-ENTMCNC: 32.5 G/DL (ref 32–36)
MCV RBC AUTO: 84 FL (ref 82–98)
MONOCYTES # BLD AUTO: 1 K/UL (ref 0.3–1)
MONOCYTES NFR BLD: 12.7 % (ref 4–15)
NEUTROPHILS # BLD AUTO: 4.3 K/UL (ref 1.8–7.7)
NEUTROPHILS NFR BLD: 56.7 % (ref 38–73)
NRBC BLD-RTO: 0 /100 WBC
PLATELET # BLD AUTO: 231 K/UL (ref 150–350)
PMV BLD AUTO: 10.7 FL (ref 9.2–12.9)
POTASSIUM SERPL-SCNC: 3.8 MMOL/L (ref 3.5–5.1)
POTASSIUM SERPL-SCNC: 3.8 MMOL/L (ref 3.5–5.1)
PROT SERPL-MCNC: 7 G/DL (ref 6–8.4)
RBC # BLD AUTO: 4.54 M/UL (ref 4–5.4)
SODIUM SERPL-SCNC: 138 MMOL/L (ref 136–145)
SODIUM SERPL-SCNC: 138 MMOL/L (ref 136–145)
TROPONIN I SERPL DL<=0.01 NG/ML-MCNC: 0.01 NG/ML (ref 0–0.03)
WBC # BLD AUTO: 7.64 K/UL (ref 3.9–12.7)

## 2020-07-11 PROCEDURE — 93005 ELECTROCARDIOGRAM TRACING: CPT

## 2020-07-11 PROCEDURE — 83880 ASSAY OF NATRIURETIC PEPTIDE: CPT

## 2020-07-11 PROCEDURE — 84484 ASSAY OF TROPONIN QUANT: CPT

## 2020-07-11 PROCEDURE — 85025 COMPLETE CBC W/AUTO DIFF WBC: CPT

## 2020-07-11 PROCEDURE — 82550 ASSAY OF CK (CPK): CPT

## 2020-07-11 PROCEDURE — 99285 EMERGENCY DEPT VISIT HI MDM: CPT | Mod: 25

## 2020-07-11 PROCEDURE — 80053 COMPREHEN METABOLIC PANEL: CPT

## 2020-07-11 PROCEDURE — 93010 ELECTROCARDIOGRAM REPORT: CPT | Mod: ,,, | Performed by: INTERNAL MEDICINE

## 2020-07-11 PROCEDURE — 93010 EKG 12-LEAD: ICD-10-PCS | Mod: ,,, | Performed by: INTERNAL MEDICINE

## 2020-07-11 NOTE — ED PROVIDER NOTES
SCRIBE #1 NOTE: I, Syeda Raphael, am scribing for, and in the presence of, Luiz Russell MD. I have scribed the entire note.       History     Chief Complaint   Patient presents with    Chest Pain     pt reports cp that began this morning upon waking; described as midsternal stabbing     Review of patient's allergies indicates:   Allergen Reactions    Sulfur Swelling         History of Present Illness     HPI    7/11/2020, 6:11 AM  History obtained from the patient      History of Present Illness: Renee Ruelas is a 39 y.o. female patient with a h/o anxiety, esophageal stricture, GERD, migraine HA, who presents to the Emergency Department for evaluation of stabbing mid sternal CP which onset this morning upon waking up. Symptoms are constant and moderate in severity. No mitigating or exacerbating factors reported. Associated sxs include SOB, fever, and cough. Patient denies any n/v/d, dysuria, HA, and all other sxs at this time. No prior Tx reported. No further complaints or concerns at this time.       Arrival mode: Personal transportation      PCP: Jose Angel Tobar MD      Past Medical History:  Past Medical History:   Diagnosis Date    Anxiety     Esophageal stricture     GERD (gastroesophageal reflux disease)     Migraine headache        Past Surgical History:  Past Surgical History:   Procedure Laterality Date    ESOPHAGEAL DILATION      ESOPHAGOGASTRODUODENOSCOPY      ESOPHAGOGASTRODUODENOSCOPY N/A 6/30/2020    Procedure: ESOPHAGOGASTRODUODENOSCOPY (EGD);  Surgeon: Kishor Cordero MD;  [facility];  Service: Endoscopy;  Laterality: N/A;  NEEDS RAPID COVID 19 TEST--PT COMING FROM [place]         Family History:  Family History   Problem Relation Age of Onset    Diabetes Father     Kidney disease Father     Lymphoma Mother     Colon cancer Neg Hx     Colon polyps Neg Hx     Esophageal cancer Neg Hx     Irritable bowel syndrome Neg Hx     Inflammatory bowel disease Neg  Hx     Stomach cancer Neg Hx        Social History:   Social History     Tobacco Use    Smoking status: Never Smoker    Smokeless tobacco: Never Used   Substance and Sexual Activity    Alcohol use: No    Drug use: No    Sexual activity: Yes     Partners: Male        Review of Systems     Review of Systems   Constitutional: Positive for fever.   HENT: Negative for sore throat.    Respiratory: Positive for cough and shortness of breath.    Cardiovascular: Positive for chest pain.   Gastrointestinal: Negative for diarrhea, nausea and vomiting.   Genitourinary: Negative for dysuria.   Musculoskeletal: Negative for back pain.   Skin: Negative for rash.   Neurological: Negative for headaches.   Hematological: Does not bruise/bleed easily.   All other systems reviewed and are negative.       Physical Exam     Initial Vitals [07/11/20 0509]   BP Pulse Resp Temp SpO2   (!) 140/64 104 20 98.8 °F (37.1 °C) 99 %      MAP       --          Physical Exam  Nursing Notes and Vital Signs Reviewed.  Constitutional: Well-developed and well-nourished.   Head: Atraumatic. Normocephalic.  Eyes: EOM intact. No scleral icterus.  ENT: Mucous membranes are moist. Oropharynx is clear and symmetric.    Neck: Supple. Full ROM. No lymphadenopathy.  Cardiovascular: Tachycardic. Regular rhythm. No murmurs, rubs, or gallops. Distal pulses are 2+ and symmetric.  Pulmonary/Chest: No respiratory distress. Clear to auscultation bilaterally. No wheezing or rales.  Abdominal: Soft and non-distended.  There is no tenderness.  No rebound, guarding, or rigidity. Good bowel sounds.  Genitourinary: No CVA tenderness  Musculoskeletal: Moves all extremities. No obvious deformities. No calf tenderness.  Skin: Warm and dry.  Neurological:  Alert, awake, and appropriate.  Normal speech.  No acute focal neurological deficits are appreciated.  Psychiatric: Normal affect. Good eye contact. Appropriate in content.     ED Course   Procedures  ED Vital  "Signs:  Vitals:    07/11/20 0509 07/11/20 0530   BP: (!) 140/64 133/89   Pulse: 104 109   Resp: 20 20   Temp: 98.8 °F (37.1 °C)    TempSrc: Oral    SpO2: 99% 100%   Weight: 81.1 kg (178 lb 12.7 oz)    Height: 5' 1" (1.549 m)        Abnormal Lab Results:  Labs Reviewed   BASIC METABOLIC PANEL - Abnormal; Notable for the following components:       Result Value    CO2 22 (*)     Calcium 8.4 (*)     All other components within normal limits   COMPREHENSIVE METABOLIC PANEL - Abnormal; Notable for the following components:    CO2 22 (*)     Calcium 8.4 (*)     Albumin 3.4 (*)     Alkaline Phosphatase 44 (*)     All other components within normal limits   CBC W/ AUTO DIFFERENTIAL   B-TYPE NATRIURETIC PEPTIDE   CK   TROPONIN I   URINALYSIS, REFLEX TO URINE CULTURE        All Lab Results:  Results for orders placed or performed during the hospital encounter of 07/11/20   CBC auto differential   Result Value Ref Range    WBC 7.64 3.90 - 12.70 K/uL    RBC 4.54 4.00 - 5.40 M/uL    Hemoglobin 12.4 12.0 - 16.0 g/dL    Hematocrit 38.2 37.0 - 48.5 %    Mean Corpuscular Volume 84 82 - 98 fL    Mean Corpuscular Hemoglobin 27.3 27.0 - 31.0 pg    Mean Corpuscular Hemoglobin Conc 32.5 32.0 - 36.0 g/dL    RDW 13.3 11.5 - 14.5 %    Platelets 231 150 - 350 K/uL    MPV 10.7 9.2 - 12.9 fL    Immature Granulocytes 0.5 0.0 - 0.5 %    Gran # (ANC) 4.3 1.8 - 7.7 K/uL    Immature Grans (Abs) 0.04 0.00 - 0.04 K/uL    Lymph # 2.1 1.0 - 4.8 K/uL    Mono # 1.0 0.3 - 1.0 K/uL    Eos # 0.2 0.0 - 0.5 K/uL    Baso # 0.04 0.00 - 0.20 K/uL    nRBC 0 0 /100 WBC    Gran% 56.7 38.0 - 73.0 %    Lymph% 27.6 18.0 - 48.0 %    Mono% 12.7 4.0 - 15.0 %    Eosinophil% 2.0 0.0 - 8.0 %    Basophil% 0.5 0.0 - 1.9 %    Differential Method Automated    Brain Natriuretic Peptide   Result Value Ref Range    BNP <10 0 - 99 pg/mL   Basic metabolic panel   Result Value Ref Range    Sodium 138 136 - 145 mmol/L    Potassium 3.8 3.5 - 5.1 mmol/L    Chloride 108 95 - 110 mmol/L "    CO2 22 (L) 23 - 29 mmol/L    Glucose 100 70 - 110 mg/dL    BUN, Bld 10 6 - 20 mg/dL    Creatinine 0.9 0.5 - 1.4 mg/dL    Calcium 8.4 (L) 8.7 - 10.5 mg/dL    Anion Gap 8 8 - 16 mmol/L    eGFR if African American >60 >60 mL/min/1.73 m^2    eGFR if non African American >60 >60 mL/min/1.73 m^2   CK   Result Value Ref Range    CPK 62 20 - 180 U/L   Comprehensive metabolic panel   Result Value Ref Range    Sodium 138 136 - 145 mmol/L    Potassium 3.8 3.5 - 5.1 mmol/L    Chloride 108 95 - 110 mmol/L    CO2 22 (L) 23 - 29 mmol/L    Glucose 100 70 - 110 mg/dL    BUN, Bld 10 6 - 20 mg/dL    Creatinine 0.9 0.5 - 1.4 mg/dL    Calcium 8.4 (L) 8.7 - 10.5 mg/dL    Total Protein 7.0 6.0 - 8.4 g/dL    Albumin 3.4 (L) 3.5 - 5.2 g/dL    Total Bilirubin 0.2 0.1 - 1.0 mg/dL    Alkaline Phosphatase 44 (L) 55 - 135 U/L    AST 21 10 - 40 U/L    ALT 17 10 - 44 U/L    Anion Gap 8 8 - 16 mmol/L    eGFR if African American >60 >60 mL/min/1.73 m^2    eGFR if non African American >60 >60 mL/min/1.73 m^2   Troponin I   Result Value Ref Range    Troponin I 0.011 0.000 - 0.026 ng/mL         Imaging Results          X-Ray Chest AP Portable (In process)                  The EKG was ordered, reviewed, and independently interpreted by the ED provider.  Interpretation time: 6:02  Rate: 112 BPM  Rhythm: Sinus tachycardia  Interpretation: Possible left atrial enlargement. Left ventricular hypertrophy. Nonspecific T wave abnormality. No STEMI.      The Emergency Provider reviewed the vital signs and test results, which are outlined above.     ED Discussion       8:50 AM: Reassessed pt at this time.  Pt states her condition has improved at this time. Discussed with pt all pertinent ED information and results. Discussed pt dx and plan of tx. Gave pt all f/u and return to the ED instructions. All questions and concerns were addressed at this time. Pt expresses understanding of information and instructions, and is comfortable with plan to discharge. Pt is  stable for discharge.    I discussed with patient and/or family/caretaker that evaluation in the ED does not suggest any emergent or life threatening medical conditions requiring immediate intervention beyond what was provided in the ED, and I believe patient is safe for discharge.  Regardless, an unremarkable evaluation in the ED does not preclude the development or presence of a serious of life threatening condition. As such, patient was instructed to return immediately for any worsening or change in current symptoms.       MDM        Medical Decision Making:   Clinical Tests:   Lab Tests: Ordered and Reviewed  Radiological Study: Ordered and Reviewed  Medical Tests: Ordered and Reviewed           ED Medication(s):  Medications - No data to display    New Prescriptions    No medications on file       Follow-up Information     Jose Angel Tobar MD.    Specialty: Internal Medicine  Contact information:  [address] [facility] [address] [phone]                       Scribe Attestation:   Scribe #1: I performed the above scribed service and the documentation accurately describes the services I performed. I attest to the accuracy of the note.     Attending:   Physician Attestation Statement for Scribe #1: I, Luiz Russell MD, personally performed the services described in this documentation, as scribed by Syeda Raphael, in my presence, and it is both accurate and complete.           Clinical Impression       ICD-10-CM ICD-9-CM   1. Chest pain  R07.9 786.50   2. Cough  R05 786.2       Disposition:   Disposition: Discharged  Condition: Stable         Luiz Russell MD  07/11/20 0900

## 2020-07-11 NOTE — ED NOTES
Received report from ELLA Richey and ELLA Albright, introduced self to pt, and updated white board.   Pt resting comfortably watching tv and in NAD at this time. Will continue to monitor.

## 2020-07-17 ENCOUNTER — HOSPITAL ENCOUNTER (EMERGENCY)
Facility: HOSPITAL | Age: 40
Discharge: HOME | End: 2020-07-17
Attending: EMERGENCY MEDICINE
Payer: COMMERCIAL

## 2020-07-17 VITALS
HEART RATE: 91 BPM | WEIGHT: 173 LBS | TEMPERATURE: 98 F | DIASTOLIC BLOOD PRESSURE: 97 MMHG | BODY MASS INDEX: 32.66 KG/M2 | OXYGEN SATURATION: 99 % | SYSTOLIC BLOOD PRESSURE: 151 MMHG | HEIGHT: 61 IN | RESPIRATION RATE: 18 BRPM

## 2020-07-17 DIAGNOSIS — M54.50 BILATERAL LOW BACK PAIN WITHOUT SCIATICA, UNSPECIFIED CHRONICITY: Primary | ICD-10-CM

## 2020-07-17 DIAGNOSIS — N39.0 URINARY TRACT INFECTION WITHOUT HEMATURIA, SITE UNSPECIFIED: ICD-10-CM

## 2020-07-17 DIAGNOSIS — I10 ESSENTIAL HYPERTENSION: ICD-10-CM

## 2020-07-17 DIAGNOSIS — Z91.148 NONCOMPLIANCE WITH MEDICATION REGIMEN: ICD-10-CM

## 2020-07-17 LAB
B-HCG UR QL: NEGATIVE
BACTERIA #/AREA URNS AUTO: ABNORMAL /HPF
BILIRUB UR QL STRIP: NEGATIVE
CLARITY UR REFRACT.AUTO: ABNORMAL
COLOR UR AUTO: YELLOW
CTP QC/QA: YES
GLUCOSE UR QL STRIP: NEGATIVE
HGB UR QL STRIP: ABNORMAL
KETONES UR QL STRIP: ABNORMAL
LEUKOCYTE ESTERASE UR QL STRIP: ABNORMAL
MICROSCOPIC COMMENT: ABNORMAL
NITRITE UR QL STRIP: NEGATIVE
PH UR STRIP: 6 [PH] (ref 5–8)
PROT UR QL STRIP: NEGATIVE
RBC #/AREA URNS AUTO: 1 /HPF (ref 0–4)
SP GR UR STRIP: 1.01 (ref 1–1.03)
SQUAMOUS #/AREA URNS AUTO: 2 /HPF
URN SPEC COLLECT METH UR: ABNORMAL
WBC #/AREA URNS AUTO: 26 /HPF (ref 0–5)

## 2020-07-17 PROCEDURE — 81025 URINE PREGNANCY TEST: CPT | Performed by: EMERGENCY MEDICINE

## 2020-07-17 PROCEDURE — 81001 URINALYSIS AUTO W/SCOPE: CPT

## 2020-07-17 PROCEDURE — 99284 EMERGENCY DEPT VISIT MOD MDM: CPT | Mod: 25

## 2020-07-17 PROCEDURE — 87086 URINE CULTURE/COLONY COUNT: CPT

## 2020-07-17 PROCEDURE — 99284 EMERGENCY DEPT VISIT MOD MDM: CPT | Mod: ,,, | Performed by: EMERGENCY MEDICINE

## 2020-07-17 PROCEDURE — 99284 PR EMERGENCY DEPT VISIT,LEVEL IV: ICD-10-PCS | Mod: ,,, | Performed by: EMERGENCY MEDICINE

## 2020-07-17 RX ORDER — NAPROXEN 500 MG/1
500 TABLET ORAL 2 TIMES DAILY WITH MEALS
Qty: 20 TABLET | Refills: 0 | Status: SHIPPED | OUTPATIENT
Start: 2020-07-17 | End: 2020-11-18 | Stop reason: CLARIF

## 2020-07-17 RX ORDER — LEVOFLOXACIN 250 MG/1
250 TABLET ORAL DAILY
Qty: 5 TABLET | Refills: 0 | Status: SHIPPED | OUTPATIENT
Start: 2020-07-17 | End: 2020-07-22

## 2020-07-17 RX ORDER — CYCLOBENZAPRINE HCL 10 MG
10 TABLET ORAL 3 TIMES DAILY PRN
Qty: 9 TABLET | Refills: 0 | Status: SHIPPED | OUTPATIENT
Start: 2020-07-17 | End: 2020-07-22

## 2020-07-17 RX ORDER — AMLODIPINE BESYLATE 10 MG/1
10 TABLET ORAL DAILY
Qty: 30 TABLET | Refills: 0 | Status: SHIPPED | OUTPATIENT
Start: 2020-07-17 | End: 2020-11-25

## 2020-07-17 NOTE — ED PROVIDER NOTES
Encounter Date: 7/17/2020    SCRIBE #1 NOTE: I, Hudson Dahl, am scribing for, and in the presence of,  Maykel Hoyt MD. I have scribed the entire note.       History     Chief Complaint   Patient presents with    Back Pain     last week had uti, c/o back pain. today is last day of abx      Ms. Ruelas is a 39 y.o. female with a past medical history of GERD and migraines who presents to the ED with bilateral lower back pain that began last week. She reports that last week she was diagnosed with a UTI by an urgent care and was given Macrobid. Since then, the UTI symptoms have resolved, but her lower back has begun to hurt - no hx of back trauma. She took tylenol yesterday morning. She did not contact her primary care physician because she did not realize he could treat her UTI. She denies fever, vomiting, diarrhea, dysuria, and hematuria.    The history is provided by the patient and medical records.     Review of patient's allergies indicates:   Allergen Reactions    Sulfur Swelling     Past Medical History:   Diagnosis Date    Anxiety     Esophageal stricture     GERD (gastroesophageal reflux disease)     Migraine headache      Past Surgical History:   Procedure Laterality Date    ESOPHAGEAL DILATION      ESOPHAGOGASTRODUODENOSCOPY      ESOPHAGOGASTRODUODENOSCOPY N/A 6/30/2020    Procedure: ESOPHAGOGASTRODUODENOSCOPY (EGD);  Surgeon: Kishor Cordero MD;  Location: [facility];  Service: Endoscopy;  Laterality: N/A;  NEEDS RAPID COVID 19 TEST--PT COMING FROM [place]     Family History   Problem Relation Age of Onset    Diabetes Father     Kidney disease Father     Lymphoma Mother     Colon cancer Neg Hx     Colon polyps Neg Hx     Esophageal cancer Neg Hx     Irritable bowel syndrome Neg Hx     Inflammatory bowel disease Neg Hx     Stomach cancer Neg Hx      Social History     Tobacco Use    Smoking status: Never Smoker    Smokeless tobacco: Never Used   Substance Use Topics    Alcohol use: No     Drug use: No     Review of Systems   Constitutional: Negative for fever.   HENT: Negative for sore throat.    Respiratory: Negative for shortness of breath.    Cardiovascular: Negative for chest pain.   Gastrointestinal: Negative for abdominal distention, abdominal pain, diarrhea, nausea and vomiting.   Genitourinary: Negative for decreased urine volume, dysuria, hematuria and vaginal discharge.   Musculoskeletal: Positive for back pain (bilateral, lower back) and myalgias. Negative for gait problem.   Skin: Negative for rash.   Neurological: Negative for weakness, light-headedness and numbness.   Hematological: Does not bruise/bleed easily.   Psychiatric/Behavioral: Negative for behavioral problems and confusion.   All other systems reviewed and are negative.      Physical Exam     Initial Vitals [07/17/20 1149]   BP Pulse Resp Temp SpO2   (!) 132/91 98 18 98.2 °F (36.8 °C) 98 %      MAP       --         Vitals:    07/17/20 1341   BP: (!) 151/97   Pulse: 91   Resp:    Temp: 98.1 °F (36.7 °C)       Physical Exam    Nursing note and vitals reviewed.  Constitutional: She appears well-developed and well-nourished. No distress.   HENT:   Head: Normocephalic and atraumatic.   Eyes: EOM are normal. Pupils are equal, round, and reactive to light.   Neck: Normal range of motion. Neck supple.   Cardiovascular: Normal rate, regular rhythm, normal heart sounds and intact distal pulses.   Pulmonary/Chest: Breath sounds normal. No stridor. She has no wheezes. She has no rhonchi.   Abdominal: Soft. Bowel sounds are normal. She exhibits no mass. There is no rebound and no guarding.   Musculoskeletal: Normal range of motion. No edema.      Lumbar back: She exhibits tenderness and pain. She exhibits normal range of motion, no bony tenderness, no deformity and no spasm.        Back:    Neurological: She is alert and oriented to person, place, and time. She has normal strength. No sensory deficit.   Skin: Skin is warm and dry. No  rash noted. No erythema.   Psychiatric: She has a normal mood and affect. Her behavior is normal. Judgment and thought content normal.         ED Course   Procedures  Labs Reviewed   URINALYSIS, REFLEX TO URINE CULTURE - Abnormal; Notable for the following components:       Result Value    Appearance, UA Hazy (*)     Ketones, UA 3+ (*)     Occult Blood UA 2+ (*)     Leukocytes, UA 3+ (*)     All other components within normal limits    Narrative:     Specimen Source->Urine   URINALYSIS MICROSCOPIC - Abnormal; Notable for the following components:    WBC, UA 26 (*)     Bacteria Many (*)     All other components within normal limits    Narrative:     Specimen Source->Urine   CULTURE, URINE   POCT URINE PREGNANCY         Results for orders placed or performed during the hospital encounter of 07/17/20   Urinalysis, Reflex to Urine Culture Urine, Clean Catch    Specimen: Urine   Result Value Ref Range    Specimen UA Urine, Clean Catch     Color, UA Yellow Yellow, Straw, Nancy    Appearance, UA Hazy (A) Clear    pH, UA 6.0 5.0 - 8.0    Specific Gravity, UA 1.015 1.005 - 1.030    Protein, UA Negative Negative    Glucose, UA Negative Negative    Ketones, UA 3+ (A) Negative    Bilirubin (UA) Negative Negative    Occult Blood UA 2+ (A) Negative    Nitrite, UA Negative Negative    Leukocytes, UA 3+ (A) Negative   Urinalysis Microscopic   Result Value Ref Range    RBC, UA 1 0 - 4 /hpf    WBC, UA 26 (H) 0 - 5 /hpf    Bacteria Many (A) None-Occ /hpf    Squam Epithel, UA 2 /hpf    Microscopic Comment SEE COMMENT    POCT urine pregnancy   Result Value Ref Range    POC Preg Test, Ur Negative Negative     Acceptable Yes          Imaging Results    None          Medical Decision Making:   History:   Old Medical Records: I decided to obtain old medical records.  Clinical Tests:   Lab Tests: Ordered and Reviewed  ED Management:  1:37PM: I informed patient of her UTI as well as my plan to change her to a stronger  antibiotic. I discussed with her as well that her back pain is likely unrelated as kidney pain is usually higher. As such I will give her a muscle relaxant to treat the pain. I also discussed her high blood pressure. Upon retaking her vitals, it was sustained high. The patient then informed me that she was previously on 10 mg amlodipine but has not been taking it. I offered to renew her prescription and she expressed that she would like that.            Scribe Attestation:   Scribe #1: I performed the above scribed service and the documentation accurately describes the services I performed. I attest to the accuracy of the note.                          Clinical Impression:       ICD-10-CM ICD-9-CM   1. Bilateral low back pain without sciatica, unspecified chronicity  M54.5 724.2   2. Urinary tract infection without hematuria, site unspecified  N39.0 599.0   3. Essential hypertension  I10 401.9   4. Noncompliance with medication regimen  Z91.14 V15.81         Disposition:   Disposition: Discharged  Condition: Stable     ED Disposition Condition    Discharge Stable        ED Prescriptions     Medication Sig Dispense Start Date End Date Auth. Provider    levoFLOXacin (LEVAQUIN) 250 MG tablet Take 1 tablet (250 mg total) by mouth once daily. for 5 days 5 tablet 7/17/2020 7/22/2020 Maykel Hoyt MD    cyclobenzaprine (FLEXERIL) 10 MG tablet Take 1 tablet (10 mg total) by mouth 3 (three) times daily as needed for Muscle spasms. 9 tablet 7/17/2020 7/22/2020 Maykel Hoyt MD    naproxen (NAPROSYN) 500 MG tablet Take 1 tablet (500 mg total) by mouth 2 (two) times daily with meals. 20 tablet 7/17/2020  Maykel Hoyt MD    amLODIPine (NORVASC) 10 MG tablet Take 1 tablet (10 mg total) by mouth once daily. 30 tablet 7/17/2020 7/17/2021 Maykel Hoyt MD        Follow-up Information     Follow up With Specialties Details Why Contact Info    Jose Angel Tobar MD Internal Medicine Schedule an appointment as soon as possible for a visit  in 3 days  [address] [facility] [address]  [phone]                                       Maykel Hoyt MD  07/17/20 2047

## 2020-07-17 NOTE — ED TRIAGE NOTES
"Pt arrived from home with c/o 8/10 bilateral back pain since yesterday. Pt states " I have a UTI and have been taking my meds but now my back is hurting. I may be dehydrated." Denies N/V/D/F.   "

## 2020-07-19 LAB — BACTERIA UR CULT: NO GROWTH

## 2020-07-29 ENCOUNTER — OFFICE VISIT (OUTPATIENT)
Dept: GASTROENTEROLOGY | Facility: CLINIC | Age: 40
End: 2020-07-29
Payer: COMMERCIAL

## 2020-07-29 VITALS — BODY MASS INDEX: 31.59 KG/M2 | RESPIRATION RATE: 16 BRPM | WEIGHT: 167.31 LBS | HEIGHT: 61 IN

## 2020-07-29 DIAGNOSIS — K52.81 EOSINOPHILIC GASTRITIS: Primary | ICD-10-CM

## 2020-07-29 DIAGNOSIS — R13.10 DYSPHAGIA, UNSPECIFIED TYPE: ICD-10-CM

## 2020-07-29 PROCEDURE — 99214 PR OFFICE/OUTPT VISIT, EST, LEVL IV, 30-39 MIN: ICD-10-PCS | Mod: S$GLB,,, | Performed by: INTERNAL MEDICINE

## 2020-07-29 PROCEDURE — 3008F BODY MASS INDEX DOCD: CPT | Mod: CPTII,S$GLB,, | Performed by: INTERNAL MEDICINE

## 2020-07-29 PROCEDURE — 99999 PR PBB SHADOW E&M-EST. PATIENT-LVL III: CPT | Mod: PBBFAC,,, | Performed by: INTERNAL MEDICINE

## 2020-07-29 PROCEDURE — 3008F PR BODY MASS INDEX (BMI) DOCUMENTED: ICD-10-PCS | Mod: CPTII,S$GLB,, | Performed by: INTERNAL MEDICINE

## 2020-07-29 PROCEDURE — 99999 PR PBB SHADOW E&M-EST. PATIENT-LVL III: ICD-10-PCS | Mod: PBBFAC,,, | Performed by: INTERNAL MEDICINE

## 2020-07-29 PROCEDURE — 99214 OFFICE O/P EST MOD 30 MIN: CPT | Mod: S$GLB,,, | Performed by: INTERNAL MEDICINE

## 2020-07-29 NOTE — PROGRESS NOTES
Subjective:       Patient ID: Renee Ruelas is a 39 y.o. female.    Chief Complaint: Follow-up (PROCEDURE FOLLOW UP)    This is a 39-year-old female here for a follow-up visit.  She was initially seen with reflux symptoms described as a substernal burning sensation which was acute in nature, moderate intensity with incomplete relief with PPI therapy.  Prior imaging had noted some possible esophageal spasm with reflux.  Endoscopic evaluation in 2017 was noting of a normal esophagus, only fundic gland polyps on biopsy.  A repeat upper endoscopy was done with the below listed pathology, with 69 eosinophils per high-power field in the gastric biopsies.  No other change in her symptoms.    EGD Pathology  .1. Stomach (biopsy):  - Benign gastric mucosa, with focal active gastritis and up to 69 eosinophils per high-power field (see  comment)  - H. pylori immunostain is negative; H. pylori stain with appropriate controls  - No intestinal metaplasia, dysplasia or malignancy  2. Distal Esophagus (biopsy):  - Benign squamous mucosa, no histologic abnormality  - Zero intraepithelial eosinophils  3. Proximal Esophagus (biopsy):  - Benign squamous mucosa, no histologic abnormality  - Zero intraepithelial eosinophils    The following portions of the patient's history were reviewed and updated as appropriate: allergies, current medications, past family history, past medical history, past social history, past surgical history and problem list.    (Portions of this note were dictated using voice recognition software and may contain dictation related errors in spelling/grammar/syntax not found on text review)  HPI  Review of Systems   Constitutional: Negative for fatigue and unexpected weight change.   Respiratory: Negative for apnea and chest tightness.    Cardiovascular: Negative for chest pain and palpitations.   Gastrointestinal: Negative for abdominal distention and abdominal pain.         Objective:      Physical  Exam  Vitals signs and nursing note reviewed.   Constitutional:       General: She is not in acute distress.     Appearance: Normal appearance. She is well-developed. She is not diaphoretic.   HENT:      Head: Normocephalic and atraumatic.   Eyes:      General: No scleral icterus.     Conjunctiva/sclera: Conjunctivae normal.   Neck:      Musculoskeletal: Normal range of motion and neck supple.      Thyroid: No thyromegaly.      Trachea: No tracheal deviation.   Pulmonary:      Effort: Pulmonary effort is normal. No respiratory distress.   Abdominal:      General: Bowel sounds are normal. There is no distension.      Palpations: Abdomen is soft.   Skin:     General: Skin is warm and dry.      Findings: No erythema or rash.   Neurological:      General: No focal deficit present.      Mental Status: She is alert and oriented to person, place, and time.   Psychiatric:         Mood and Affect: Mood normal.         Behavior: Behavior normal.           Labs/imaging; reviewed  Assessment:       1. Eosinophilic gastritis        Plan:   1. Allergy referral to consider allergy testing  2. No peripheral eosinophilia noted on labs  3. Consider esophageal manometry if allergy testing non-revealing and UGI symptoms persist

## 2020-07-31 ENCOUNTER — TELEPHONE (OUTPATIENT)
Dept: ADMINISTRATIVE | Facility: OTHER | Age: 40
End: 2020-07-31

## 2020-08-05 ENCOUNTER — TELEPHONE (OUTPATIENT)
Dept: GASTROENTEROLOGY | Facility: CLINIC | Age: 40
End: 2020-08-05

## 2020-08-05 NOTE — TELEPHONE ENCOUNTER
I called patient back regading her message. Patient states she has acid reflux and anxiety. Patient states she getting shortness of breathe and chest pains. Also shortness of breathe when drinking liquids. Advised patient if she still getting shortness of breathe to go to ER. I will also speak to doctor to get her set up for manometry test.

## 2020-11-15 ENCOUNTER — HOSPITAL ENCOUNTER (EMERGENCY)
Facility: HOSPITAL | Age: 40
Discharge: HOME | End: 2020-11-15
Attending: EMERGENCY MEDICINE
Payer: COMMERCIAL

## 2020-11-15 VITALS
RESPIRATION RATE: 18 BRPM | OXYGEN SATURATION: 99 % | SYSTOLIC BLOOD PRESSURE: 124 MMHG | HEART RATE: 84 BPM | BODY MASS INDEX: 28.64 KG/M2 | DIASTOLIC BLOOD PRESSURE: 85 MMHG | TEMPERATURE: 99 F | WEIGHT: 151.56 LBS

## 2020-11-15 DIAGNOSIS — K21.00 GASTROESOPHAGEAL REFLUX DISEASE WITH ESOPHAGITIS WITHOUT HEMORRHAGE: Primary | ICD-10-CM

## 2020-11-15 PROCEDURE — 99283 EMERGENCY DEPT VISIT LOW MDM: CPT

## 2020-11-15 RX ORDER — OMEPRAZOLE 20 MG/1
20 CAPSULE, DELAYED RELEASE ORAL DAILY
Qty: 30 CAPSULE | Refills: 1 | Status: SHIPPED | OUTPATIENT
Start: 2020-11-15 | End: 2020-11-25

## 2020-11-15 NOTE — ED PROVIDER NOTES
"SCRIBE #1 NOTE: I, Lisa Tristan, am scribing for, and in the presence of, Luiz Russell MD. I have scribed the entire note.       History     Chief Complaint   Patient presents with    Shortness of Breath     hx of anxiety/reflux. "Every time I eat I feel short of breath." Pt denies shortness of breath at this time.      Review of patient's allergies indicates:   Allergen Reactions    Sulfur Swelling         History of Present Illness     HPI    11/15/2020, 9:06 AM  History obtained from the patient      History of Present Illness: Renee Ruelas is a 39 y.o. female patient with a PMHx of anxiety, esophageal stricture, and GERD who presents to the Emergency Department for evaluation of SOB. She does not currently have symptoms. Symptoms are intermittent and moderate in severity. Symptoms are exacerbated after eating. No mitigating factors reported. No associated sxs. Patient denies any n/v/d, constipation, abdominal pain, fever, chills, CP, cough, leg swelling, dizziness, abdominal distention, and all other sxs at this time. Patient states she used to take medication for GERD. She changed her diet and was no longer having symptoms so stopped the medication. Reports her symptoms today being similar to her GERD and anxiety symptoms. She had a scope done in June 2020 and was told she may have a possible food allergy causing symptoms. No further complaints or concerns at this time.       Arrival mode: Personal vehicle    PCP: Jose Angel Tobar MD        Past Medical History:  Past Medical History:   Diagnosis Date    Anxiety     Esophageal stricture     GERD (gastroesophageal reflux disease)     Migraine headache        Past Surgical History:  Past Surgical History:   Procedure Laterality Date    ESOPHAGEAL DILATION      ESOPHAGOGASTRODUODENOSCOPY      ESOPHAGOGASTRODUODENOSCOPY N/A 6/30/2020    Procedure: ESOPHAGOGASTRODUODENOSCOPY (EGD);  Surgeon: Kishor Cordero MD;  Location: [facility] ENDO;  " Service: Endoscopy;  Laterality: N/A;  NEEDS RAPID COVID 19 TEST--PT COMING FROM [place]         Family History:  Family History   Problem Relation Age of Onset    Diabetes Father     Kidney disease Father     Lymphoma Mother     Colon cancer Neg Hx     Colon polyps Neg Hx     Esophageal cancer Neg Hx     Irritable bowel syndrome Neg Hx     Inflammatory bowel disease Neg Hx     Stomach cancer Neg Hx        Social History:  Social History     Tobacco Use    Smoking status: Never Smoker    Smokeless tobacco: Never Used   Substance and Sexual Activity    Alcohol use: No    Drug use: No    Sexual activity: Yes     Partners: Male        Review of Systems     Review of Systems   Constitutional: Negative for activity change, appetite change, chills, diaphoresis, fatigue and fever.   HENT: Negative for congestion, ear pain, nosebleeds, rhinorrhea, sinus pain, sore throat and trouble swallowing.    Eyes: Negative for pain and discharge.   Respiratory: Positive for shortness of breath. Negative for cough, chest tightness, wheezing and stridor.    Cardiovascular: Negative for chest pain, palpitations and leg swelling.   Gastrointestinal: Negative for abdominal distention, abdominal pain, blood in stool, constipation, diarrhea, nausea and vomiting.   Genitourinary: Negative for difficulty urinating, dysuria, flank pain, frequency, hematuria and urgency.   Musculoskeletal: Negative for arthralgias, back pain, myalgias and neck pain.   Skin: Negative for pallor, rash and wound.   Neurological: Negative for dizziness, syncope, weakness, light-headedness, numbness and headaches.   Hematological: Does not bruise/bleed easily.   Psychiatric/Behavioral: Negative for confusion and self-injury.   All other systems reviewed and are negative.     Physical Exam     Initial Vitals [11/15/20 0837]   BP Pulse Resp Temp SpO2   124/85 84 18 99 °F (37.2 °C) 99 %      MAP       --          Physical Exam  Nursing Notes and Vital  Signs Reviewed.  Constitutional: Patient is in no acute distress. Well-developed and well-nourished.  Head: Atraumatic. Normocephalic.  Eyes: PERRL. EOM intact. Conjunctivae are not pale. No scleral icterus.  ENT: Mucous membranes are moist. Oropharynx is clear and symmetric.    Neck: Supple. Full ROM. No lymphadenopathy.  Cardiovascular: Regular rate. Regular rhythm. No murmurs, rubs, or gallops. Distal pulses are 2+ and symmetric.  Pulmonary/Chest: No respiratory distress. Clear to auscultation bilaterally. No wheezing or rales.  Abdominal: Soft and non-distended.  There is no tenderness.  No rebound, guarding, or rigidity. Good bowel sounds.  Genitourinary: No CVA tenderness  Musculoskeletal: Moves all extremities. No obvious deformities. No edema. No calf tenderness.  Skin: Warm and dry.  Neurological:  Alert, awake, and appropriate.  Normal speech.  No acute focal neurological deficits are appreciated.  Psychiatric: Normal affect. Good eye contact. Appropriate in content.     ED Course   Procedures  ED Vital Signs:  Vitals:    11/15/20 0837   BP: 124/85   Pulse: 84   Resp: 18   Temp: 99 °F (37.2 °C)   TempSrc: Oral   SpO2: 99%   Weight: 68.8 kg (151 lb 9.1 oz)              The Emergency Provider reviewed the vital signs and test results, which are outlined above.     ED Discussion     9:13 AM: Reassessed pt at this time. Discussed with pt all pertinent ED information and results. Discussed pt dx and plan of tx. Gave pt all f/u and return to the ED instructions. All questions and concerns were addressed at this time. Pt expresses understanding of information and instructions, and is comfortable with plan to discharge. Pt is stable for discharge.    I discussed with patient that evaluation in the ED does not suggest any emergent or life threatening medical conditions requiring immediate intervention beyond what was provided in the ED, and I believe patient is safe for discharge. Regardless, an unremarkable  evaluation in the ED does not preclude the development or presence of a serious of life threatening condition. As such, patient was instructed to return immediately for any worsening or change in current symptoms.                 ED Medication(s):  Medications - No data to display    Discharge Medication List as of 11/15/2020  9:07 AM      START taking these medications    Details   omeprazole (PRILOSEC) 20 MG capsule Take 1 capsule (20 mg total) by mouth once daily., Starting Sun 11/15/2020, Until Mon 11/15/2021, Normal             Follow-up Information     GI.    Contact information:  [phone]           Schedule an appointment as soon as possible for a visit  with [facility].    Specialty: Internal Medicine  Contact information:  [address] [address] [phone]  Additional information:  2nd Floor - From [address], take the [facility] exit ([address] Enter the facility from the [address]                     Scribe Attestation:   Scribe #1: I performed the above scribed service and the documentation accurately describes the services I performed. I attest to the accuracy of the note.     Attending:   Physician Attestation Statement for Scribe #1: I, Luiz Russell MD, personally performed the services described in this documentation, as scribed by Lisa Tristan, in my presence, and it is both accurate and complete.           Clinical Impression       ICD-10-CM ICD-9-CM   1. Gastroesophageal reflux disease with esophagitis without hemorrhage  K21.00 530.81     530.10       Disposition:   Disposition: Discharged  Condition: Stable         Luiz Russell MD  11/15/20 0941

## 2020-11-15 NOTE — ED NOTES
Pt examined by Drew  without RN, educated on prescriptions, given discharge instructions and discharged to [facility]. See provider notes for exam.

## 2020-11-18 ENCOUNTER — HOSPITAL ENCOUNTER (EMERGENCY)
Facility: HOSPITAL | Age: 40
Discharge: HOME | End: 2020-11-18
Attending: EMERGENCY MEDICINE
Payer: COMMERCIAL

## 2020-11-18 ENCOUNTER — NURSE TRIAGE (OUTPATIENT)
Dept: ADMINISTRATIVE | Facility: CLINIC | Age: 40
End: 2020-11-18

## 2020-11-18 VITALS
TEMPERATURE: 98 F | SYSTOLIC BLOOD PRESSURE: 135 MMHG | DIASTOLIC BLOOD PRESSURE: 83 MMHG | BODY MASS INDEX: 28.64 KG/M2 | RESPIRATION RATE: 16 BRPM | OXYGEN SATURATION: 99 % | HEIGHT: 61 IN | HEART RATE: 100 BPM | WEIGHT: 151.69 LBS

## 2020-11-18 DIAGNOSIS — R06.02 SOB (SHORTNESS OF BREATH): ICD-10-CM

## 2020-11-18 LAB
BUN SERPL-MCNC: 9 MG/DL (ref 6–30)
CHLORIDE SERPL-SCNC: 102 MMOL/L (ref 95–110)
CREAT SERPL-MCNC: 0.7 MG/DL (ref 0.5–1.4)
GLUCOSE SERPL-MCNC: 133 MG/DL (ref 70–110)
HCT VFR BLD CALC: 39 %PCV (ref 36–54)
POC IONIZED CALCIUM: 1.2 MMOL/L (ref 1.06–1.42)
POC TCO2 (MEASURED): 26 MMOL/L (ref 23–29)
POTASSIUM BLD-SCNC: 3.4 MMOL/L (ref 3.5–5.1)
SAMPLE: ABNORMAL
SODIUM BLD-SCNC: 142 MMOL/L (ref 136–145)
TROPONIN I SERPL DL<=0.01 NG/ML-MCNC: <0.006 NG/ML (ref 0–0.03)

## 2020-11-18 PROCEDURE — 80047 BASIC METABLC PNL IONIZED CA: CPT

## 2020-11-18 PROCEDURE — 93005 ELECTROCARDIOGRAM TRACING: CPT

## 2020-11-18 PROCEDURE — 93010 ELECTROCARDIOGRAM REPORT: CPT | Mod: ,,, | Performed by: INTERNAL MEDICINE

## 2020-11-18 PROCEDURE — 25000003 PHARM REV CODE 250: Performed by: PHYSICIAN ASSISTANT

## 2020-11-18 PROCEDURE — 99285 EMERGENCY DEPT VISIT HI MDM: CPT | Mod: 25

## 2020-11-18 PROCEDURE — 99284 EMERGENCY DEPT VISIT MOD MDM: CPT | Mod: ,,, | Performed by: PHYSICIAN ASSISTANT

## 2020-11-18 PROCEDURE — 99284 PR EMERGENCY DEPT VISIT,LEVEL IV: ICD-10-PCS | Mod: ,,, | Performed by: PHYSICIAN ASSISTANT

## 2020-11-18 PROCEDURE — 93010 EKG 12-LEAD: ICD-10-PCS | Mod: ,,, | Performed by: INTERNAL MEDICINE

## 2020-11-18 PROCEDURE — 84484 ASSAY OF TROPONIN QUANT: CPT

## 2020-11-18 RX ORDER — MAG HYDROX/ALUMINUM HYD/SIMETH 200-200-20
5 SUSPENSION, ORAL (FINAL DOSE FORM) ORAL
Status: COMPLETED | OUTPATIENT
Start: 2020-11-18 | End: 2020-11-18

## 2020-11-18 RX ORDER — HYDROXYZINE HYDROCHLORIDE 25 MG/1
25 TABLET, FILM COATED ORAL EVERY 6 HOURS PRN
Qty: 12 TABLET | Refills: 0 | Status: SHIPPED | OUTPATIENT
Start: 2020-11-18 | End: 2020-11-25

## 2020-11-18 RX ADMIN — ALUMINUM HYDROXIDE, MAGNESIUM HYDROXIDE, AND SIMETHICONE 5 ML: 200; 200; 20 SUSPENSION ORAL at 02:11

## 2020-11-18 NOTE — DISCHARGE INSTRUCTIONS
Your labs and imaging today are unremarkable.  Continue your acid reflux medication.  Avoid spicy foods, tomato sauce, peppermint, chocolate, tobacco use, alcohol, carbonated drinks, and other foods that exacerbate your acid reflux.  Consider taken fast acting antacids such as Maalox or Mylanta over-the-counter.  Use hydroxyzine once every 6 hr as needed for anxiety.  Call and follow up closely with primary care physician.  Consider seeing a counselor.  Return promptly to the emergency department for new or worsening symptoms.  Future Appointments   Date Time Provider Department Center   12/2/2020  8:30 AM Sarah Luis MD ON [facility]     Our goal in the emergency department is to always give you outstanding care and exceptional service. You may receive a survey by mail or e-mail in the next week regarding your experience in our ED. We would greatly appreciate your completing and returning the survey. Your feedback provides us with a way to recognize our staff who give very good care and it helps us learn how to improve when your experience was below our aspiration of excellence.

## 2020-11-18 NOTE — PROVIDER PROGRESS NOTES - EMERGENCY DEPT.
Encounter Date: 11/18/2020    ED Physician Progress Notes         EKG - STEMI Decision  Initial Reading: No STEMI present.  Response: No Action Needed.

## 2020-11-18 NOTE — ED TRIAGE NOTES
Patient states SOB onset Saturday, went to , Sunday with negative COVID. Denieis cough, States SOB intermittent . Denies fevers.

## 2020-11-18 NOTE — ED NOTES
Patient identifiers verified and correct for Ms Ruelas  C/C: SOB SEE NN  APPEARANCE: awake and alert in NAD.  SKIN: warm, dry and intact. No breakdown or bruising.  MUSCULOSKELETAL: Patient moving all extremities spontaneously, no obvious swelling or deformities noted. Ambulates independently.  RESPIRATORY: Positive  shortness of breath.Respirations unlabored. Denies cough, Denies fevers.   CARDIAC:Positive CP, 2+ distal pulses; no peripheral edema  ABDOMEN: S/ND/NT, Denies nausea  : voids spontaneously, denies difficulty  Neurologic: AAO x 4; follows commands equal strength in all extremities; denies numbness/tingling. Denies dizziness Denies weakness,

## 2020-11-18 NOTE — ED PROVIDER NOTES
Encounter Date: 11/18/2020       History     Chief Complaint   Patient presents with    Shortness of Breath     Pt c/o SOB.  States unsure if related to her anxiety.  Pt had negative Covid 2 days ago     2:09 PM  Patient is a 39-year-old female with a history of migraine headache, GERD, anxiety presents the ED with shortness of breath and chest pain.  States for the past 7 days, she has been having intermittent symptoms.  Reports shortness of breath with exertion and even at rest.  States that her shortness of breath is worse after eating.  Develops chest pain almost on a daily basis.  She thinks that this could be associated with her acid reflux because she had similar symptoms in the past, but changed her diet and has not had her symptoms in a while until 7 days ago.  She is unsure if this is her anxiety or not.  She does endorse stress during this pandemic.  She has not noted any fever, cough, vomiting.    She denies any recent travel or surgery.  Denies hormone use.  Denies history of cancer.  Has never coughed or notice hemoptysis.  No lower extremity edema or pain.  She went to urgent care recently and had a negative CoVid test.  She is aware that she has an allergy test soon because she states that there are certain foods that will make her shortness of breath worse and is concerned about being allergic.    Future Appointments  12/2/2020  8:30 AM    Sarah Luis MD             [facility]    Medical           Review of patient's allergies indicates:   Allergen Reactions    Sulfur Swelling     Past Medical History:   Diagnosis Date    Anxiety     Esophageal stricture     GERD (gastroesophageal reflux disease)     Migraine headache      Past Surgical History:   Procedure Laterality Date    ESOPHAGEAL DILATION      ESOPHAGOGASTRODUODENOSCOPY      ESOPHAGOGASTRODUODENOSCOPY N/A 6/30/2020    Procedure: ESOPHAGOGASTRODUODENOSCOPY (EGD);  Surgeon: Kishor Cordero MD;  Location: [facility];  Service:  Endoscopy;  Laterality: N/A;  NEEDS RAPID COVID 19 TEST--PT COMING FROM [place]     Family History   Problem Relation Age of Onset    Diabetes Father     Kidney disease Father     Lymphoma Mother     Colon cancer Neg Hx     Colon polyps Neg Hx     Esophageal cancer Neg Hx     Irritable bowel syndrome Neg Hx     Inflammatory bowel disease Neg Hx     Stomach cancer Neg Hx      Social History     Tobacco Use    Smoking status: Never Smoker    Smokeless tobacco: Never Used   Substance Use Topics    Alcohol use: No    Drug use: No     Review of Systems   Constitutional: Negative for chills and fever.   HENT: Negative for sore throat.    Eyes: Negative for photophobia.   Respiratory: Positive for shortness of breath. Negative for cough.    Cardiovascular: Positive for chest pain.   Gastrointestinal: Negative for abdominal pain, nausea and vomiting.   Genitourinary: Negative for dysuria, frequency and hematuria.   Musculoskeletal: Negative for back pain.   Skin: Negative for rash.   Neurological: Negative for weakness, light-headedness and headaches.   Hematological: Does not bruise/bleed easily.       Physical Exam     Initial Vitals [11/18/20 1357]   BP Pulse Resp Temp SpO2   125/84 89 16 98 °F (36.7 °C) 100 %      MAP       --         Physical Exam    Vitals reviewed.  Constitutional: She appears well-developed and well-nourished. She is not diaphoretic. She is cooperative.  Non-toxic appearance. She does not have a sickly appearance. She does not appear ill. No distress. Face mask in place.   HENT:   Head: Normocephalic and atraumatic.   Nose: Nose normal.   Eyes: Conjunctivae and EOM are normal.   Neck: Normal range of motion.   Cardiovascular: Normal rate.   No peripheral edema or calf tenderness.    Pulmonary/Chest: No respiratory distress.   Abdominal: Soft. She exhibits no distension. There is no abdominal tenderness.   Musculoskeletal: Normal range of motion.   Neurological: She is alert. She has  normal strength.   Skin: Skin is warm and dry. No erythema. No pallor.   Psychiatric: She has a normal mood and affect.         ED Course   Procedures  Labs Reviewed   ISTAT PROCEDURE - Abnormal; Notable for the following components:       Result Value    POC Glucose 133 (*)     POC Potassium 3.4 (*)     All other components within normal limits   TROPONIN I   ISTAT CHEM8        ECG Results          EKG 12-lead (Final result)  Result time 11/18/20 14:59:07    Final result by Interface, Lab In [facility] (11/18/20 14:59:07)                 Narrative:    Test Reason : R06.02,    Vent. Rate : 089 BPM     Atrial Rate : 089 BPM     P-R Int : 114 ms          QRS Dur : 074 ms      QT Int : 342 ms       P-R-T Axes : 114 -04 -22 degrees     QTc Int : 416 ms    Normal sinus rhythm  T wave abnormality, consider anterior ischemia  Abnormal ECG  When compared with ECG of 11-JUL-2020 06:02,  No significant change was found  Confirmed by JUAN ANTONIO FAROOQ MD ([id number]) on 11/18/2020 2:58:57 PM    Referred By: [email or web address]   SELF           Confirmed By:JUAN ANTONIO FAROOQ MD                            Imaging Results          X-Ray Chest PA And Lateral (Final result)  Result time 11/18/20 15:26:22    Final result by Manjinder Ware MD (11/18/20 15:26:22)                 Impression:      No acute cardiopulmonary process.      Electronically signed by: Manjinder Ware MD  Date:    11/18/2020  Time:    15:26             Narrative:    EXAMINATION:  XR CHEST PA AND LATERAL    CLINICAL HISTORY:  Shortness of breath    TECHNIQUE:  PA and lateral views of the chest were performed.    COMPARISON:  Chest 07/11/2020    FINDINGS:  Heart size is within normal limits.  Mediastinum is unremarkable.  There is no tracheal abnormalities.    Low lung volumes when compared to the previous study.  However there is no acute lobar consolidations or pneumothorax or pulmonary vascular congestion or pleural effusions.  Visualized bony thorax is unremarkable.                                  Medical Decision Making:   History:   Old Medical Records: I decided to obtain old medical records.  Initial Assessment:   Patient is a 39-year-old female with a history of migraine headache, GERD, anxiety presents the ED with shortness of breath and chest pain.    Differential Diagnosis:   Includes but is not limited to acid reflux, gastritis, anxiety, stress reaction, ACS.  No cough.  No rales or wheezing on exam.  No peripheral edema.  Low suspicion for pneumonia or heart failure.  Patient with little risk factors for from pulmonary embolism.  0 on Well's Criteria.  I doubt PE.  Independently Interpreted Test(s):   I have ordered and independently interpreted X-rays - see summary below.  I have ordered and independently interpreted EKG Reading(s) - see summary below  Clinical Tests:   Lab Tests: Reviewed and Ordered  Radiological Study: Reviewed and Ordered  Medical Tests: Reviewed  ED Management:  Will initiate workup, give Maalox for suspected acid reflux, and continue monitor.    EKG with NSR at 89 beats per minute.  T-wave inversions noted in lead 3 and V3 seen on previous EKGs.  Normal intervals.  No STEMI.  I-STAT with mild hypokalemia at 3.4.  No kidney injury.  No anemia.  Troponin negative.  No need to trend given chest pain for 7 days.  Chest x-ray with no acute cardiopulmonary process.  No focal consolidation, vascular congestion, pleural effusion, or cardiomegaly.    Patient reassessed.  Reports feeling fine.  Vitals remained stable.  Given history, physical exam, and workup today, I doubt any life-threatening or emergent causes.  Likely that her symptoms is due to anxiety versus GERD.  I discussed etiology.  Continue Protonix or omeprazole.  Will prescribe hydroxyzine for anxiety.  Follow up closely.  Return to ED precautions given.  All questions answered.  Patient comfortable with plan and stable for discharge.    Additional MDM:     Well's Criteria Score:  -Clinical symptoms of DVT (leg  swelling, pain with palpation) = 0.0  -Other diagnosis less likely than pulmonary embolism =            0.0  -Heart Rate >100 =   0.0  -Immobilization (= or > than 3 days) or surgery in the previous 4 weeks = 0.0  -Previous DVT/PE = 0.0  -Hemoptysis =          0.0  -Malignancy =           0.0  Well's Probability Score =    0                                Clinical Impression:       ICD-10-CM ICD-9-CM   1. SOB (shortness of breath)  R06.02 786.05                      Disposition:   Disposition: Discharged  Condition: Stable     ED Disposition Condition    Discharge Stable        ED Prescriptions     Medication Sig Dispense Start Date End Date Auth. Provider    hydrOXYzine HCL (ATARAX) 25 MG tablet Take 1 tablet (25 mg total) by mouth every 6 (six) hours as needed for Anxiety. 12 tablet 11/18/2020  Swapna Mayo PA-C        Follow-up Information     Follow up With Specialties Details Why Contact Info    Jose Angel Tobar MD Internal Medicine Schedule an appointment as soon as possible for a visit   [address] [facility] [address] [id number] [phone]      [facility]  If symptoms worsen [address] [facility] [address] [phone]                                       Swapna Mayo PA-C  11/18/20 0921

## 2020-11-18 NOTE — TELEPHONE ENCOUNTER
Patient also reports that she's having intermittent chest pain, as well.  She does have a hx of anxiety, but unsure if it's related to that, and is out of xanax.  I advised ED for evluation.  Reason for Disposition   Chest pain   Difficulty breathing    Additional Information   Negative: Breathing stopped and hasn't returned   Negative: Choking on something   Negative: SEVERE difficulty breathing (e.g., struggling for each breath, speaks in single words, pulse > 120)   Negative: Bluish (or gray) lips or face   Negative: Difficult to awaken or acting confused (e.g., disoriented, slurred speech)   Negative: Passed out (i.e., fainted, collapsed and was not responding)   Negative: Wheezing started suddenly after medicine, an allergic food, or bee sting   Negative: Stridor   Negative: Slow, shallow and weak breathing   Negative: Sounds like a life-threatening emergency to the triager   Negative: Severe difficulty breathing (e.g., struggling for each breath, speaks in single words)   Negative: Passed out (i.e., fainted, collapsed and was not responding)   Negative: Difficult to awaken or acting confused (e.g., disoriented, slurred speech)   Negative: Shock suspected (e.g., cold/pale/clammy skin, too weak to stand, low BP, rapid pulse)   Negative: Chest pain lasting longer than 5 minutes and ANY of the following:* Over 45 years old* Over 30 years old and at least one cardiac risk factor (e.g., diabetes, high blood pressure, high cholesterol, smoker, or strong family history of heart disease)* History of heart disease (i.e., angina, heart attack, heart failure, bypass surgery, takes nitroglycerin)* Pain is crushing, pressure-like, or heavy   Negative: Heart beating < 50 beats per minute OR > 140 beats per minute   Negative: Visible sweat on face or sweat dripping down face   Negative: Sounds like a life-threatening emergency to the triager   Negative: Followed an injury to chest   Negative: SEVERE  chest pain   Negative: Pain also in shoulder(s) or arm(s) or jaw    Protocols used: BREATHING DIFFICULTY-A-OH, CHEST PAIN-A-OH

## 2020-11-18 NOTE — TELEPHONE ENCOUNTER
Sob for several days, was seen in [facility] on Monday, and tested for COVID-19 and it was negative, they said her Os sats were wnl, as well.

## 2020-11-18 NOTE — ED NOTES
I-STAT Chem-8+ Results:   Value Reference Range   Sodium 142 136-145 mmol/L   Potassium  3.4 3.5-5.1 mmol/L   Chloride 102  mmol/L   Ionized Calcium 1.2 1.06-1.42 mmol/L   CO2 (measured) 26 23-29 mmol/L   Glucose 133  mg/dL   BUN 9 6-30 mg/dL   Creatinine 0.7 0.5-1.4 mg/dL   Hematocrit 39 36-54%

## 2020-11-25 ENCOUNTER — OFFICE VISIT (OUTPATIENT)
Dept: ALLERGY | Facility: CLINIC | Age: 40
End: 2020-11-25
Payer: COMMERCIAL

## 2020-11-25 ENCOUNTER — LAB VISIT (OUTPATIENT)
Dept: LAB | Facility: HOSPITAL | Age: 40
End: 2020-11-25
Attending: ALLERGY & IMMUNOLOGY
Payer: COMMERCIAL

## 2020-11-25 DIAGNOSIS — K52.81 EOSINOPHILIC GASTRITIS: ICD-10-CM

## 2020-11-25 DIAGNOSIS — K52.81 EOSINOPHILIC GASTRITIS: Primary | ICD-10-CM

## 2020-11-25 LAB — IGE SERPL-ACNC: <35 IU/ML (ref 0–100)

## 2020-11-25 PROCEDURE — 99999 PR PBB SHADOW E&M-EST. PATIENT-LVL II: CPT | Mod: PBBFAC,,, | Performed by: ALLERGY & IMMUNOLOGY

## 2020-11-25 PROCEDURE — 82785 ASSAY OF IGE: CPT

## 2020-11-25 PROCEDURE — 86008 ALLG SPEC IGE RECOMB EA: CPT | Mod: 59

## 2020-11-25 PROCEDURE — 86003 ALLG SPEC IGE CRUDE XTRC EA: CPT | Mod: 59

## 2020-11-25 PROCEDURE — 99244 PR OFFICE CONSULTATION,LEVEL IV: ICD-10-PCS | Mod: S$GLB,,, | Performed by: ALLERGY & IMMUNOLOGY

## 2020-11-25 PROCEDURE — 99999 PR PBB SHADOW E&M-EST. PATIENT-LVL II: ICD-10-PCS | Mod: PBBFAC,,, | Performed by: ALLERGY & IMMUNOLOGY

## 2020-11-25 PROCEDURE — 86008 ALLG SPEC IGE RECOMB EA: CPT

## 2020-11-25 PROCEDURE — 36415 COLL VENOUS BLD VENIPUNCTURE: CPT

## 2020-11-25 PROCEDURE — 99244 OFF/OP CNSLTJ NEW/EST MOD 40: CPT | Mod: S$GLB,,, | Performed by: ALLERGY & IMMUNOLOGY

## 2020-11-25 RX ORDER — MECLIZINE HYDROCHLORIDE 25 MG/1
TABLET ORAL
COMMUNITY
Start: 2020-06-13

## 2020-11-25 RX ORDER — ALPRAZOLAM 0.5 MG/1
TABLET ORAL
COMMUNITY
Start: 2020-11-11 | End: 2021-04-23 | Stop reason: SDUPTHER

## 2020-11-25 RX ORDER — VALACYCLOVIR HYDROCHLORIDE 500 MG/1
TABLET, FILM COATED ORAL
COMMUNITY
Start: 2020-09-25 | End: 2021-04-23

## 2020-11-25 NOTE — LETTER
November 25, 2020      Kishor Cordero MD  [address]  [address]  [facility] [address]           [facility]  [address] [facility] [address]  Phone: [phone]  Fax: [phone]          Patient: Renee Ruelas   MR Number: [id number]   YOB: 1980   Date of Visit: 11/25/2020       Dear Dr. Kishor Cordero:    Thank you for referring Renee Ruelas to me for evaluation. Attached you will find relevant portions of my assessment and plan of care.    If you have questions, please do not hesitate to call me. I look forward to following Renee Ruelas along with you.    Sincerely,    Sarah Luis MD    Enclosure  CC:  No Recipients    If you would like to receive this communication electronically, please contact [email or web address] or [phone] to request more information on [employer] Link access.    For providers and/or their staff who would like to refer a patient to [facility], please contact us through our one-stop-shop provider referral line, [facility], at [phone].    If you feel you have received this communication in error or would no longer like to receive these types of communications, please e-mail [email or web address]

## 2020-11-25 NOTE — PROGRESS NOTES
Subjective:       Patient ID: Renee Ruelas is a 39 y.o. female.    Referred by Kishor Cordero MD    Chief Complaint:  Allergies      HPI: 39 year old female with a history of Eosinophilic gastritis- 69 per HPF eosinophils in gastric biopsy. She denies a history of food allergy. She reports that the endoscopy was done because she reported feeling short of breath when she eats. She denies a history of allergy testing. She denies runny nose, nasal congestion, watery eyes. She denies a history of hayfever.      Past Medical History:   Diagnosis Date    Anxiety     Esophageal stricture     GERD (gastroesophageal reflux disease)     Migraine headache      Family History   Problem Relation Age of Onset    Diabetes Father     Kidney disease Father     Lymphoma Mother     Colon cancer Neg Hx     Colon polyps Neg Hx     Esophageal cancer Neg Hx     Irritable bowel syndrome Neg Hx     Inflammatory bowel disease Neg Hx     Stomach cancer Neg Hx      Current Outpatient Medications on File Prior to Visit   Medication Sig Dispense Refill    ALPRAZolam (XANAX) 0.5 MG tablet TAKE 1 TABLET BY MOUTH BID AS NEEDED FOR ANXIETY      meclizine (ANTIVERT) 25 mg tablet       valACYclovir (VALTREX) 500 MG tablet TK 1 T PO BID FOR 5 DAYS      [DISCONTINUED] amLODIPine (NORVASC) 10 MG tablet Take 1 tablet (10 mg total) by mouth once daily. 30 tablet 0    [DISCONTINUED] hydrOXYzine HCL (ATARAX) 25 MG tablet Take 1 tablet (25 mg total) by mouth every 6 (six) hours as needed for Anxiety. 12 tablet 0    [DISCONTINUED] omeprazole (PRILOSEC) 20 MG capsule Take 1 capsule (20 mg total) by mouth once daily. 30 capsule 1    [DISCONTINUED] ranitidine (ZANTAC) 150 MG tablet Take 1 tablet (150 mg total) by mouth nightly. 30 tablet 11    [DISCONTINUED] sucralfate (CARAFATE) 100 mg/mL suspension Take 10 mLs (1 g total) by mouth 4 (four) times daily. (Patient not taking: Reported on 7/29/2020) 414 mL 0     No current  facility-administered medications on file prior to visit.      Review of patient's allergies indicates:   Allergen Reactions    Sulfur Swelling       Environmental History: No pets. She is not a smoker.  Review of Systems   Constitutional: Negative for chills and fever.   HENT: Negative for congestion and rhinorrhea.    Eyes: Negative for discharge.   Respiratory: Positive for chest tightness and shortness of breath. Negative for cough and wheezing.    Cardiovascular: Negative for chest pain and leg swelling.   Gastrointestinal: Negative for nausea and vomiting.   Skin: Negative for rash and wound.   Allergic/Immunologic: Negative for environmental allergies and food allergies.   Neurological: Negative for facial asymmetry and speech difficulty.   Hematological: Negative for adenopathy. Does not bruise/bleed easily.   Psychiatric/Behavioral: Negative for behavioral problems and suicidal ideas.        Objective:    Physical Exam  Vitals signs reviewed.   Constitutional:       General: She is not in acute distress.     Appearance: Normal appearance. She is well-developed. She is not ill-appearing, toxic-appearing or diaphoretic.   HENT:      Head: Normocephalic and atraumatic.      Right Ear: Tympanic membrane, ear canal and external ear normal. There is no impacted cerumen.      Left Ear: Tympanic membrane, ear canal and external ear normal. There is no impacted cerumen.      Nose: Nose normal. No congestion or rhinorrhea.      Mouth/Throat:      Mouth: Mucous membranes are moist.      Pharynx: Oropharynx is clear. No oropharyngeal exudate or posterior oropharyngeal erythema.   Eyes:      General: No scleral icterus.        Right eye: No discharge.         Left eye: No discharge.      Pupils: Pupils are equal, round, and reactive to light.   Neck:      Musculoskeletal: Normal range of motion and neck supple. No neck rigidity or muscular tenderness.      Thyroid: No thyromegaly.   Cardiovascular:      Rate and  Rhythm: Normal rate and regular rhythm.      Heart sounds: Normal heart sounds. No murmur. No friction rub. No gallop.    Pulmonary:      Effort: Pulmonary effort is normal. No respiratory distress.      Breath sounds: Normal breath sounds. No stridor. No wheezing, rhonchi or rales.   Chest:      Chest wall: No tenderness.   Abdominal:      General: Bowel sounds are normal. There is no distension.      Palpations: Abdomen is soft. There is no mass.      Tenderness: There is no abdominal tenderness. There is no guarding or rebound.      Hernia: No hernia is present.   Musculoskeletal: Normal range of motion.         General: No swelling, tenderness, deformity or signs of injury.      Right lower leg: No edema.      Left lower leg: No edema.   Lymphadenopathy:      Cervical: No cervical adenopathy.   Skin:     General: Skin is warm.      Coloration: Skin is not jaundiced or pale.      Findings: No bruising, erythema or lesion.   Neurological:      Mental Status: She is alert and oriented to person, place, and time.      Gait: Gait normal.   Psychiatric:         Mood and Affect: Mood normal.         Behavior: Behavior normal.         Thought Content: Thought content normal.         Judgment: Judgment normal.         Unable to skin prick test, as she took hydroxyzine 2 days ago.  Assessment:       1. Eosinophilic gastritis         Plan:       Unable to skin prick test, as she is taking oral antihistamines.    Eosinophilic gastritis  -     Ambulatory referral/consult to Allergy  -     IgE; Future; Expected date: 11/25/2020  -     Bahia grass IgE; Future; Expected date: 11/25/2020  -     Aspergillus fumagatus IgE; Future; Expected date: 11/25/2020  -     Chaetomium globosum IgE; Future; Expected date: 11/25/2020  -     Cockroach, American IgE; Future; Expected date: 11/25/2020  -     Cladosporium IgE; Future; Expected date: 11/25/2020  -     Curvularia lunata IgE; Future; Expected date: 11/25/2020  -     D. farinae IgE;  Future; Expected date: 11/25/2020  -     D. pteronyssinus IgE; Future; Expected date: 11/25/2020  -     Dog dander IgE; Future; Expected date: 11/25/2020  -     Plantain, English IgE; Future; Expected date: 11/25/2020  -     Eucalyptus IgE; Future; Expected date: 11/25/2020  -     Marsh elder, rough IgE; Future; Expected date: 11/25/2020  -     Mugwort IgE; Future; Expected date: 11/25/2020  -     Nettle IgE; Future; Expected date: 11/25/2020  -     Orchard grass IgE; Future; Expected date: 11/25/2020  -     [place], western white IgE; Future; Expected date: 11/25/2020  -     Privet, common IgE; Future; Expected date: 11/25/2020  -     Ragweed, short, common IgE; Future; Expected date: 11/25/2020  -     Red top grass IgE; Future; Expected date: 11/25/2020  -     Rye grass, cultivated IgE; Future; Expected date: 11/25/2020  -     Thistle, Russian IgE; Future; Expected date: 11/25/2020  -     Stemphyllium IgE; Future; Expected date: 11/25/2020  -     Gabo IgE; Future; Expected date: 11/25/2020  -     Vaughn grass IgE; Future; Expected date: 11/25/2020  -     Allergen, Pecan Tree IgE; Future; Expected date: 11/25/2020  -     [place], black IgE; Future; Expected date: 11/25/2020  -     Chauvin, bald IgE; Future; Expected date: 11/25/2020  -     Oak, white IgE; Future; Expected date: 11/25/2020  -     Allergen, Cocklebur; Future; Expected date: 11/25/2020  -     Cat epithelium IgE; Future; Expected date: 11/25/2020  -     Allergen, Hackberry Celtis; Future; Expected date: 11/25/2020  -     Allergen, Elm Cedar; Future; Expected date: 11/25/2020  -     Allergen-Dagmar; Future; Expected date: 11/25/2020  -     Allergen, Milk Components IGE; Future; Expected date: 11/25/2020  -     Wheat IgE; Future; Expected date: 11/25/2020  -     Allergen, Egg Components IGE; Future; Expected date: 11/25/2020  -     Soybean IgE; Future; Expected date: 11/25/2020  -     Allergen, Peanut Components IGE; Future; Expected date:  11/25/2020  -     Almonds IgE; Future; Expected date: 11/25/2020  -     Cashew IgE; Future; Expected date: 11/25/2020  -     Pecan, nut; Future; Expected date: 11/25/2020  -     [place] IgE; Future; Expected date: 11/25/2020  -     Beef IgE; Future; Expected date: 11/25/2020  -     Chicken IgE; Future; Expected date: 11/25/2020  -     Pork IgE; Future; Expected date: 11/25/2020    Recommend avoiding wheat and dairy.  RTC 2 weeks or sooner, if needed.  MARYBEL LIZARRAGA    Cc:Dr. Cordero

## 2020-11-29 LAB
A-LACTALB IGE QN: <0.1 KU/L
B-LACTOGLOB IGE QN: <0.1 KU/L
CASEIN IGE QN: <0.1 KU/L
COW MILK IGE QN: <0.1 KU/L
EGG WHITE IGE QN: <0.1 KU/L
OVALB IGE QN: <0.1 KU/L
OVOMUCOID IGE QN: <0.1 KU/L
WHOLE EGG IGE QN: <0.1 KU/L

## 2020-11-30 LAB
A FUMIGATUS IGE QN: <0.1 KU/L
ALLERGEN CHAETOMIUM GLOBOSUM IGE: <0.1 KU/L
ALLERGEN WHITE PINE TREE IGE: <0.1 KU/L
ALMOND IGE QN: <0.1 KU/L
ANNOTATION COMMENT IMP: NORMAL
BAHIA GRASS IGE QN: <0.1 KU/L
BALD CYPRESS IGE QN: <0.1 KU/L
BEEF IGE QN: <0.1 KU/L
C HERBARUM IGE QN: <0.1 KU/L
C LUNATA IGE QN: <0.1 KU/L
CASHEW NUT IGE QN: <0.1 KU/L
CAT DANDER IGE QN: <0.1 KU/L
CHAETOMIUM GLOB. CLASS: NORMAL
CHICKEN CLASS: NORMAL
CHICKEN IGE QN: <0.1 KU/L
COCKLEBUR IGE QN: <0.1 KU/L
COCKSFOOT IGE QN: <0.1 KU/L
COMMON RAGWEED IGE QN: <0.1 KU/L
COTTONWOOD IGE QN: <0.1 KU/L
D FARINAE IGE QN: 10.9 KU/L
D PTERONYSS IGE QN: 8.35 KU/L
DEPRECATED A FUMIGATUS IGE RAST QL: NORMAL
DEPRECATED ALMOND IGE RAST QL: NORMAL
DEPRECATED BAHIA GRASS IGE RAST QL: NORMAL
DEPRECATED BALD CYPRESS IGE RAST QL: NORMAL
DEPRECATED BEEF IGE RAST QL: NORMAL
DEPRECATED C HERBARUM IGE RAST QL: NORMAL
DEPRECATED C LUNATA IGE RAST QL: NORMAL
DEPRECATED CASHEW NUT IGE RAST QL: NORMAL
DEPRECATED CAT DANDER IGE RAST QL: NORMAL
DEPRECATED COCKLEBUR IGE RAST QL: NORMAL
DEPRECATED COCKSFOOT IGE RAST QL: NORMAL
DEPRECATED COMMON RAGWEED IGE RAST QL: NORMAL
DEPRECATED COTTONWOOD IGE RAST QL: NORMAL
DEPRECATED D FARINAE IGE RAST QL: ABNORMAL
DEPRECATED D PTERONYSS IGE RAST QL: ABNORMAL
DEPRECATED DOG DANDER IGE RAST QL: NORMAL
DEPRECATED ELDER IGE RAST QL: NORMAL
DEPRECATED ENGL PLANTAIN IGE RAST QL: NORMAL
DEPRECATED GUM-TREE IGE RAST QL: NORMAL
DEPRECATED HACKBERRY TREE IGE RAST QL: NORMAL
DEPRECATED JOHNSON GRASS IGE RAST QL: NORMAL
DEPRECATED MISC ALLERGEN IGE RAST QL: NORMAL
DEPRECATED MUGWORT IGE RAST QL: NORMAL
DEPRECATED NETTLE IGE RAST QL: NORMAL
DEPRECATED PECAN/HICK NUT IGE RAST QL: NORMAL
DEPRECATED PECAN/HICK TREE IGE RAST QL: NORMAL
DEPRECATED PER RYE GRASS IGE RAST QL: NORMAL
DEPRECATED PORK IGE RAST QL: NORMAL
DEPRECATED PRIVET IGE RAST QL: NORMAL
DEPRECATED RED TOP GRASS IGE RAST QL: NORMAL
DEPRECATED ROACH IGE RAST QL: ABNORMAL
DEPRECATED SALTWORT IGE RAST QL: NORMAL
DEPRECATED SOYBEAN IGE RAST QL: NORMAL
DEPRECATED TIMOTHY IGE RAST QL: NORMAL
DEPRECATED WALNUT IGE RAST QL: NORMAL
DEPRECATED WHEAT IGE RAST QL: NORMAL
DEPRECATED WHITE OAK IGE RAST QL: NORMAL
DEPRECATED WILLOW IGE RAST QL: NORMAL
DOG DANDER IGE QN: <0.1 KU/L
ELDER IGE QN: <0.1 KU/L
ELM CEDAR CLASS: NORMAL
ELM CEDAR, IGE: <0.1 KU/L
ENGL PLANTAIN IGE QN: <0.1 KU/L
GUM-TREE IGE QN: <0.1 KU/L
HACKBERRY CELTIS, IGE: <0.1 KU/L
JOHNSON GRASS IGE QN: <0.1 KU/L
MUGWORT IGE QN: <0.1 KU/L
NETTLE IGE QN: <0.1 KU/L
PATHOLOGY STUDY: NORMAL
PEANUT (RARA H) 1 IGE QN: <0.1 KU/L
PEANUT (RARA H) 2 IGE QN: <0.1 KU/L
PEANUT (RARA H) 3 IGE QN: <0.1 KU/L
PEANUT (RARA H) 6 IGE QN: <0.1 KU/L
PEANUT (RARA H) 8 IGE QN: <0.1 KU/L
PEANUT (RARA H) 9 IGE QN: <0.1 KU/L
PEANUT IGE QN: <0.1 KU/L
PECAN/HICK NUT IGE QN: <0.1 KU/L
PECAN/HICK TREE IGE QN: <0.1 KU/L
PER RYE GRASS IGE QN: <0.1 KU/L
PORK IGE QN: <0.1 KU/L
PRIVET IGE QN: <0.1 KU/L
RED TOP GRASS IGE QN: <0.1 KU/L
ROACH IGE QN: 2.03 KU/L
SALTWORT IGE QN: <0.1 KU/L
SOYBEAN IGE QN: <0.1 KU/L
STEMPHYLIUM HERBARUM CLASS: NORMAL
STEMPHYLLIUM, IGE: <0.1 KU/L
TIMOTHY IGE QN: <0.1 KU/L
WALNUT IGE QN: <0.1 KU/L
WHEAT IGE QN: <0.1 KU/L
WHITE OAK IGE QN: <0.1 KU/L
WHITE PINE CLASS: NORMAL
WILLOW IGE QN: <0.1 KU/L

## 2020-12-06 ENCOUNTER — HOSPITAL ENCOUNTER (EMERGENCY)
Facility: HOSPITAL | Age: 40
Discharge: HOME | End: 2020-12-06
Attending: EMERGENCY MEDICINE
Payer: COMMERCIAL

## 2020-12-06 VITALS
HEART RATE: 77 BPM | SYSTOLIC BLOOD PRESSURE: 143 MMHG | TEMPERATURE: 98 F | DIASTOLIC BLOOD PRESSURE: 94 MMHG | RESPIRATION RATE: 24 BRPM | OXYGEN SATURATION: 98 % | WEIGHT: 152.13 LBS | BODY MASS INDEX: 28.74 KG/M2

## 2020-12-06 DIAGNOSIS — R07.9 CHEST PAIN: ICD-10-CM

## 2020-12-06 LAB
ALBUMIN SERPL BCP-MCNC: 3.9 G/DL (ref 3.5–5.2)
ALP SERPL-CCNC: 49 U/L (ref 55–135)
ALT SERPL W/O P-5'-P-CCNC: 10 U/L (ref 10–44)
ANION GAP SERPL CALC-SCNC: 11 MMOL/L (ref 8–16)
AST SERPL-CCNC: 15 U/L (ref 10–40)
B-HCG UR QL: NEGATIVE
BACTERIA #/AREA URNS HPF: ABNORMAL /HPF
BASOPHILS # BLD AUTO: 0.07 K/UL (ref 0–0.2)
BASOPHILS NFR BLD: 0.6 % (ref 0–1.9)
BILIRUB SERPL-MCNC: 0.4 MG/DL (ref 0.1–1)
BILIRUB UR QL STRIP: NEGATIVE
BUN SERPL-MCNC: 9 MG/DL (ref 6–20)
CALCIUM SERPL-MCNC: 9.1 MG/DL (ref 8.7–10.5)
CHLORIDE SERPL-SCNC: 105 MMOL/L (ref 95–110)
CLARITY UR: CLEAR
CO2 SERPL-SCNC: 21 MMOL/L (ref 23–29)
COLOR UR: YELLOW
CREAT SERPL-MCNC: 0.8 MG/DL (ref 0.5–1.4)
D DIMER PPP IA.FEU-MCNC: 1.55 MG/L FEU
DIFFERENTIAL METHOD: ABNORMAL
EOSINOPHIL # BLD AUTO: 0.2 K/UL (ref 0–0.5)
EOSINOPHIL NFR BLD: 1.8 % (ref 0–8)
ERYTHROCYTE [DISTWIDTH] IN BLOOD BY AUTOMATED COUNT: 12.6 % (ref 11.5–14.5)
EST. GFR  (AFRICAN AMERICAN): >60 ML/MIN/1.73 M^2
EST. GFR  (NON AFRICAN AMERICAN): >60 ML/MIN/1.73 M^2
GLUCOSE SERPL-MCNC: 82 MG/DL (ref 70–110)
GLUCOSE UR QL STRIP: NEGATIVE
HCT VFR BLD AUTO: 39.4 % (ref 37–48.5)
HCV AB SERPL QL IA: NEGATIVE
HGB BLD-MCNC: 13 G/DL (ref 12–16)
HGB UR QL STRIP: ABNORMAL
HIV 1+2 AB+HIV1 P24 AG SERPL QL IA: NEGATIVE
IMM GRANULOCYTES # BLD AUTO: 0.05 K/UL (ref 0–0.04)
IMM GRANULOCYTES NFR BLD AUTO: 0.4 % (ref 0–0.5)
KETONES UR QL STRIP: ABNORMAL
LEUKOCYTE ESTERASE UR QL STRIP: ABNORMAL
LYMPHOCYTES # BLD AUTO: 3.5 K/UL (ref 1–4.8)
LYMPHOCYTES NFR BLD: 31.5 % (ref 18–48)
MCH RBC QN AUTO: 27.5 PG (ref 27–31)
MCHC RBC AUTO-ENTMCNC: 33 G/DL (ref 32–36)
MCV RBC AUTO: 83 FL (ref 82–98)
MICROSCOPIC COMMENT: ABNORMAL
MONOCYTES # BLD AUTO: 0.9 K/UL (ref 0.3–1)
MONOCYTES NFR BLD: 8.2 % (ref 4–15)
NEUTROPHILS # BLD AUTO: 6.4 K/UL (ref 1.8–7.7)
NEUTROPHILS NFR BLD: 57.5 % (ref 38–73)
NITRITE UR QL STRIP: NEGATIVE
NRBC BLD-RTO: 0 /100 WBC
PH UR STRIP: 6 [PH] (ref 5–8)
PLATELET # BLD AUTO: 245 K/UL (ref 150–350)
PMV BLD AUTO: 10.5 FL (ref 9.2–12.9)
POTASSIUM SERPL-SCNC: 3.6 MMOL/L (ref 3.5–5.1)
PROT SERPL-MCNC: 7.8 G/DL (ref 6–8.4)
PROT UR QL STRIP: NEGATIVE
RBC # BLD AUTO: 4.73 M/UL (ref 4–5.4)
RBC #/AREA URNS HPF: 2 /HPF (ref 0–4)
SODIUM SERPL-SCNC: 137 MMOL/L (ref 136–145)
SP GR UR STRIP: 1.02 (ref 1–1.03)
SQUAMOUS #/AREA URNS HPF: 4 /HPF
TROPONIN I SERPL DL<=0.01 NG/ML-MCNC: <0.006 NG/ML (ref 0–0.03)
URN SPEC COLLECT METH UR: ABNORMAL
UROBILINOGEN UR STRIP-ACNC: NEGATIVE EU/DL
WBC # BLD AUTO: 11.17 K/UL (ref 3.9–12.7)
WBC #/AREA URNS HPF: 6 /HPF (ref 0–5)

## 2020-12-06 PROCEDURE — 86803 HEPATITIS C AB TEST: CPT

## 2020-12-06 PROCEDURE — 86703 HIV-1/HIV-2 1 RESULT ANTBDY: CPT

## 2020-12-06 PROCEDURE — 93010 ELECTROCARDIOGRAM REPORT: CPT | Mod: ,,, | Performed by: INTERNAL MEDICINE

## 2020-12-06 PROCEDURE — 85379 FIBRIN DEGRADATION QUANT: CPT

## 2020-12-06 PROCEDURE — 81000 URINALYSIS NONAUTO W/SCOPE: CPT

## 2020-12-06 PROCEDURE — 93005 ELECTROCARDIOGRAM TRACING: CPT

## 2020-12-06 PROCEDURE — 80053 COMPREHEN METABOLIC PANEL: CPT

## 2020-12-06 PROCEDURE — 81025 URINE PREGNANCY TEST: CPT

## 2020-12-06 PROCEDURE — 85025 COMPLETE CBC W/AUTO DIFF WBC: CPT

## 2020-12-06 PROCEDURE — 84484 ASSAY OF TROPONIN QUANT: CPT

## 2020-12-06 PROCEDURE — 93010 EKG 12-LEAD: ICD-10-PCS | Mod: ,,, | Performed by: INTERNAL MEDICINE

## 2020-12-06 PROCEDURE — 25000003 PHARM REV CODE 250: Performed by: EMERGENCY MEDICINE

## 2020-12-06 PROCEDURE — 99285 EMERGENCY DEPT VISIT HI MDM: CPT | Mod: 25

## 2020-12-06 PROCEDURE — 25500020 PHARM REV CODE 255: Performed by: EMERGENCY MEDICINE

## 2020-12-06 RX ORDER — ASPIRIN 325 MG
325 TABLET ORAL
Status: COMPLETED | OUTPATIENT
Start: 2020-12-06 | End: 2020-12-06

## 2020-12-06 RX ADMIN — ASPIRIN 325 MG ORAL TABLET 325 MG: 325 PILL ORAL at 05:12

## 2020-12-06 RX ADMIN — IOHEXOL 100 ML: 350 INJECTION, SOLUTION INTRAVENOUS at 08:12

## 2020-12-06 NOTE — ED NOTES
Pt c/o sharp midsternal CP with SOB, weakness, and dizziness x 2 weeks. Pain rated 7/10. Reports hx of anxiety, took xanax PTA. Denies fever, n/v/d, HA, numbness, tingling, vision changes or any other symptoms at this time.     Patient moved to ED room 1, patient assisted onto stretcher and changed into a gown. Patient placed on cardiac monitor, continuous pulse oximetry and automatic blood pressure cuff. Bed placed in low locked position, side rails up x 2, call light is within reach of patient or family, orientation to room and explanation of wait provided to family and patient, alarms set and turned on for monitor and pulse ox, will continue to monitor.    Patient identifies self as Renee Ruelas    LOC: The patient is awake, alert and aware of environment with an appropriate affect, the patient is oriented x 3 and speaking appropriately.  APPEARANCE: Patient resting comfortably and in no acute distress, patient is clean and well groomed, patient's clothing is properly fastened.  SKIN: The skin is warm and dry, color consistent with ethnicity, patient has normal skin turgor and moist mucus membranes, skin intact, no breakdown or bruising noted.  MUSCULOSKELETAL: Patient moving all extremities well, no obvious swelling or deformities noted.  RESPIRATORY: Airway is open and patent, respirations are spontaneous, patient has a normal effort and rate, no accessory muscle use noted.  CARDIAC: Patient has a normal rate and rhythm, no peripheral edema noted, capillary refill < 3 seconds.  ABDOMEN: Soft and non tender to palpation, no distention noted.  NEUROLOGIC: PERRL, eyes open spontaneously, behavior appropriate to situation, follows commands, facial expression symmetrical, bilateral hand grasp equal and even, purposeful motor response noted, normal sensation in all extremities when touched with a finger.

## 2020-12-06 NOTE — ED NOTES
----- Message from Anna Gutierrez MD sent at 11/3/2020 12:33 PM CST -----  Please get the patient on the phone as they have abnormal results. If they wish to speak to me then grab me. Please instruct them of the following:    Salt levels worse! If goes lower, may need to [facility]. Stop coffee/ tea/free water/soda. Can drink gatoraide, juices, etc. She needs to decrease the AMOUNT of fluid she is drinking by at least 1/3, low BUN suggests taking in too much liquid. ,Do labs in 3 days cbc/chem/serum osmolarity/urine osmolarity/ua, and schedule with nephrology. .. Can increase her salt intak slightly also. Bed: 01  Expected date:   Expected time:   Means of arrival:   Comments:

## 2020-12-06 NOTE — ED PROVIDER NOTES
"SCRIBE #1 NOTE: I, Enid Reno, am scribing for, and in the presence of, Zackary Barroso MD. I have scribed the entire note.       History     Chief Complaint   Patient presents with    Chest Pain     CP x several weeks with SOB     Review of patient's allergies indicates:   Allergen Reactions    Sulfur Swelling         History of Present Illness     HPI    12/6/2020, 5:35 PM  History obtained from the patient      History of Present Illness: Renee Ruelas is a 40 y.o. female patient with a PMHx of anxiety, GERD, and esophageal stricture who presents to the Emergency Department for evaluation of mid-sternal CP which onset gradually several weeks ago. Pt reports pain is "uncomfortable." Symptoms are constant and moderate in severity. Sxs exacerbated by lying down. No mitigating factors reported. Associated sxs include SOB. Patient denies any fever, chills, numbness, weakness, n/v/d, abd pain, recent travel/long car rides, sick contact, and all other sxs at this time. No prior tx. LNMP x1 month ago. No further complaints or concerns at this time.       Arrival mode: Personal vehicle     PCP: Jose Angel Tobar MD        Past Medical History:  Past Medical History:   Diagnosis Date    Anxiety     Esophageal stricture     GERD (gastroesophageal reflux disease)     Migraine headache        Past Surgical History:  Past Surgical History:   Procedure Laterality Date    ESOPHAGEAL DILATION      ESOPHAGOGASTRODUODENOSCOPY      ESOPHAGOGASTRODUODENOSCOPY N/A 6/30/2020    Procedure: ESOPHAGOGASTRODUODENOSCOPY (EGD);  Surgeon: Kishor Cordero MD;  Location: [facility];  Service: Endoscopy;  Laterality: N/A;  NEEDS RAPID COVID 19 TEST--PT COMING FROM [place]         Family History:  Family History   Problem Relation Age of Onset    Diabetes Father     Kidney disease Father     Lymphoma Mother     Colon cancer Neg Hx     Colon polyps Neg Hx     Esophageal cancer Neg Hx     Irritable bowel syndrome Neg Hx     " Inflammatory bowel disease Neg Hx     Stomach cancer Neg Hx        Social History:  Social History     Tobacco Use    Smoking status: Never Smoker    Smokeless tobacco: Never Used   Substance and Sexual Activity    Alcohol use: No    Drug use: No    Sexual activity: Yes     Partners: Male        Review of Systems     Review of Systems   Constitutional: Negative for chills and fever.        (-) sick contact  (-) recent travel/long car rides   HENT: Negative for sore throat.    Respiratory: Positive for shortness of breath.    Cardiovascular: Positive for chest pain (mid-sternal).   Gastrointestinal: Negative for abdominal pain, diarrhea, nausea and vomiting.   Genitourinary: Negative for dysuria.   Musculoskeletal: Negative for back pain.   Skin: Negative for rash.   Neurological: Negative for weakness and numbness.   Hematological: Does not bruise/bleed easily.   All other systems reviewed and are negative.     Physical Exam     Initial Vitals [12/06/20 1644]   BP Pulse Resp Temp SpO2   132/77 95 16 98.3 °F (36.8 °C) 100 %      MAP       --          Physical Exam  Nursing Notes and Vital Signs Reviewed.  Constitutional: Patient is in no acute distress. Well-developed and well-nourished.  Head: Atraumatic. Normocephalic.  Eyes: PERRL. EOM intact. Conjunctivae are not pale. No scleral icterus.  ENT: Mucous membranes are moist. Oropharynx is clear and symmetric.    Neck: Supple. Full ROM. No lymphadenopathy.  Cardiovascular: Regular rate. Regular rhythm. No murmurs, rubs, or gallops. Distal pulses are 2+ and symmetric.  Pulmonary/Chest: No respiratory distress. Clear to auscultation bilaterall. No wheezing or rales.  Abdominal: Soft and non-distended.  There is no tenderness.  No rebound, guarding, or rigidity. Good bowel sounds.  Genitourinary: No CVA tenderness  Musculoskeletal: Moves all extremities. No obvious deformities. No edema. No calf tenderness.  Skin: Warm and dry.  Neurological:  Alert, awake, and  appropriate.  Normal speech.  No acute focal neurological deficits are appreciated.  Psychiatric: Normal affect. Good eye contact. Appropriate in content.     ED Course   Procedures  ED Vital Signs:  Vitals:    12/06/20 1644 12/06/20 1749 12/06/20 1800 12/06/20 1855   BP: 132/77  (!) 129/94 136/79   Pulse: 95 85 76 81   Resp: 16  20 17   Temp: 98.3 °F (36.8 °C)      TempSrc: Oral      SpO2: 100%  100% 100%   Weight: 69 kg (152 lb 1.9 oz)       12/06/20 2031   BP: (!) 143/94   Pulse: 77   Resp: (!) 24   Temp:    TempSrc:    SpO2: 98%   Weight:        Abnormal Lab Results:  Labs Reviewed   CBC W/ AUTO DIFFERENTIAL - Abnormal; Notable for the following components:       Result Value    Immature Grans (Abs) 0.05 (*)     All other components within normal limits   COMPREHENSIVE METABOLIC PANEL - Abnormal; Notable for the following components:    CO2 21 (*)     Alkaline Phosphatase 49 (*)     All other components within normal limits   D DIMER, QUANTITATIVE - Abnormal; Notable for the following components:    D-Dimer 1.55 (*)     All other components within normal limits   URINALYSIS, REFLEX TO URINE CULTURE - Abnormal; Notable for the following components:    Ketones, UA 1+ (*)     Occult Blood UA Trace (*)     Leukocytes, UA Trace (*)     All other components within normal limits    Narrative:     Specimen Source->Urine   URINALYSIS MICROSCOPIC - Abnormal; Notable for the following components:    WBC, UA 6 (*)     All other components within normal limits    Narrative:     Specimen Source->Urine   HIV 1 / 2 ANTIBODY   HEPATITIS C ANTIBODY   TROPONIN I   PREGNANCY TEST, URINE RAPID    Narrative:     Specimen Source->Urine        All Lab Results:  Results for orders placed or performed during the hospital encounter of 12/06/20   HIV 1/2 Ag/Ab (4th Gen)   Result Value Ref Range    HIV 1/2 Ag/Ab Negative Negative   Hepatitis C antibody   Result Value Ref Range    Hepatitis C Ab Negative Negative   CBC auto differential    Result Value Ref Range    WBC 11.17 3.90 - 12.70 K/uL    RBC 4.73 4.00 - 5.40 M/uL    Hemoglobin 13.0 12.0 - 16.0 g/dL    Hematocrit 39.4 37.0 - 48.5 %    MCV 83 82 - 98 fL    MCH 27.5 27.0 - 31.0 pg    MCHC 33.0 32.0 - 36.0 g/dL    RDW 12.6 11.5 - 14.5 %    Platelets 245 150 - 350 K/uL    MPV 10.5 9.2 - 12.9 fL    Immature Granulocytes 0.4 0.0 - 0.5 %    Gran # (ANC) 6.4 1.8 - 7.7 K/uL    Immature Grans (Abs) 0.05 (H) 0.00 - 0.04 K/uL    Lymph # 3.5 1.0 - 4.8 K/uL    Mono # 0.9 0.3 - 1.0 K/uL    Eos # 0.2 0.0 - 0.5 K/uL    Baso # 0.07 0.00 - 0.20 K/uL    nRBC 0 0 /100 WBC    Gran % 57.5 38.0 - 73.0 %    Lymph % 31.5 18.0 - 48.0 %    Mono % 8.2 4.0 - 15.0 %    Eosinophil % 1.8 0.0 - 8.0 %    Basophil % 0.6 0.0 - 1.9 %    Differential Method Automated    Comprehensive metabolic panel   Result Value Ref Range    Sodium 137 136 - 145 mmol/L    Potassium 3.6 3.5 - 5.1 mmol/L    Chloride 105 95 - 110 mmol/L    CO2 21 (L) 23 - 29 mmol/L    Glucose 82 70 - 110 mg/dL    BUN 9 6 - 20 mg/dL    Creatinine 0.8 0.5 - 1.4 mg/dL    Calcium 9.1 8.7 - 10.5 mg/dL    Total Protein 7.8 6.0 - 8.4 g/dL    Albumin 3.9 3.5 - 5.2 g/dL    Total Bilirubin 0.4 0.1 - 1.0 mg/dL    Alkaline Phosphatase 49 (L) 55 - 135 U/L    AST 15 10 - 40 U/L    ALT 10 10 - 44 U/L    Anion Gap 11 8 - 16 mmol/L    eGFR if African American >60 >60 mL/min/1.73 m^2    eGFR if non African American >60 >60 mL/min/1.73 m^2   Troponin I #1   Result Value Ref Range    Troponin I <0.006 0.000 - 0.026 ng/mL   D dimer, quantitative   Result Value Ref Range    D-Dimer 1.55 (H) <0.50 mg/L FEU   Urinalysis, Reflex to Urine Culture Urine, Clean Catch    Specimen: Urine   Result Value Ref Range    Specimen UA Urine, Clean Catch     Color, UA Yellow Yellow, Straw, Nancy    Appearance, UA Clear Clear    pH, UA 6.0 5.0 - 8.0    Specific Gravity, UA 1.020 1.005 - 1.030    Protein, UA Negative Negative    Glucose, UA Negative Negative    Ketones, UA 1+ (A) Negative    Bilirubin  (UA) Negative Negative    Occult Blood UA Trace (A) Negative    Nitrite, UA Negative Negative    Urobilinogen, UA Negative <2.0 EU/dL    Leukocytes, UA Trace (A) Negative   Rapid Pregnancy, Urine   Result Value Ref Range    Preg Test, Ur Negative    Urinalysis Microscopic   Result Value Ref Range    RBC, UA 2 0 - 4 /hpf    WBC, UA 6 (H) 0 - 5 /hpf    Bacteria Occasional None-Occ /hpf    Squam Epithel, UA 4 /hpf    Microscopic Comment SEE COMMENT        Imaging Results:  Imaging Results          CTA Chest Non-Coronary (PE Study) (Final result)  Result time 12/06/20 20:28:23    Final result by Mehrdad Anna MD (12/06/20 20:28:23)                 Impression:      No pulmonary thromboembolism or significant pulmonary opacity.    All CT scans at this facility are performed  using dose modulation techniques as appropriate to performed exam including the following:  automated exposure control; adjustment of mA and/or kV according to the patients size (this includes techniques or standardized protocols for targeted exams where dose is matched to indication/reason for exam: i.e. extremities or head);  iterative reconstruction technique.      Electronically signed by: Mehrdad Anna  Date:    12/06/2020  Time:    20:28             Narrative:    EXAMINATION:  CTA CHEST NON CORONARY    CLINICAL HISTORY:  PE suspected, intermediate prob, positive D-dimer;    TECHNIQUE:  Low dose axial images, sagittal and coronal reformations were obtained from the thoracic inlet to the lung bases following the IV administration of 100 mL of Omnipaque 350.  Contrast timing was optimized to evaluate the pulmonary arteries.  MIP images were performed.    COMPARISON:  Chest radiograph 12/06/2020.    FINDINGS:  Base of Neck: Thyroid within normal limits.    Thoracic soft tissues: Unremarkable.    Aorta: No aneurysm, dissection, or significant stenosis.    Heart: Normal size. No effusion.    Pulmonary vasculature: Pulmonary arteries are well  opacified.  There is no pulmonary thromboembolism.    Yue/Mediastinum: No pathologic tito enlargement.    Airways: Patent.    Lungs/Pleura: Clear lungs. No pleural effusion or thickening.    Esophagus: Unremarkable.    Upper Abdomen: No abnormality of the partially imaged upper abdomen.    Bones: No acute fracture. No suspicious lytic or sclerotic lesions.                               X-Ray Chest AP Portable (Final result)  Result time 12/06/20 18:08:58    Final result by Mehrdad Anna MD (12/06/20 18:08:58)                 Impression:      No acute abnormality.      Electronically signed by: Mehrdad Anna  Date:    12/06/2020  Time:    18:08             Narrative:    EXAMINATION:  XR CHEST AP PORTABLE    CLINICAL HISTORY:  Chest Pain;    TECHNIQUE:  Single frontal view of the chest was performed.    COMPARISON:  11/18/2020.    FINDINGS:  The lungs are clear, with normal appearance of pulmonary vasculature and no pleural effusion or pneumothorax.    The cardiac silhouette is normal in size. The hilar and mediastinal contours are unremarkable.    Bones are intact.                                 The EKG was ordered, reviewed, and independently interpreted by the ED provider.  Interpretation time: 1646  Rate: 90 BPM  Rhythm: normal sinus rhythm  Interpretation: Minimal voltage criteria for LVH, may be normal variant. Nonspecific T wave abnormality. No STEMI.             The Emergency Provider reviewed the vital signs and test results, which are outlined above.     ED Discussion     8:43 PM: Reassessed pt at this time.  Pt states her condition has improved at this time. Discussed with pt all pertinent ED information and results. Discussed pt dx and plan of tx. Gave pt all f/u and return to the ED instructions. All questions and concerns were addressed at this time. Pt expresses understanding of information and instructions, and is comfortable with plan to discharge. Pt is stable for discharge.    I discussed  with patient and/or family/caretaker that evaluation in the ED does not suggest any emergent or life threatening medical conditions requiring immediate intervention beyond what was provided in the ED, and I believe patient is safe for discharge.  Regardless, an unremarkable evaluation in the ED does not preclude the development or presence of a serious of life threatening condition. As such, patient was instructed to return immediately for any worsening or change in current symptoms.\         Medical Decision Making:   Clinical Tests:   Lab Tests: Reviewed and Ordered  Medical Tests: Reviewed and Ordered           ED Medication(s):  Medications   aspirin tablet 325 mg (325 mg Oral Given 12/6/20 1738)   iohexoL (OMNIPAQUE 350) injection 100 mL (100 mLs Intravenous Given 12/6/20 2019)       Discharge Medication List as of 12/6/2020  8:36 PM          Follow-up Information     Jose Angel Tobar MD In 2 days.    Specialty: Internal Medicine  Contact information:  [address] [place] [id number]  [phone]             [facility]    Specialty: Emergency Medicine  Why: As needed, If symptoms worsen  Contact information:  [address] [place] [id number] [phone]                     Scribe Attestation:   Scribe #1: I performed the above scribed service and the documentation accurately describes the services I performed. I attest to the accuracy of the note.     Attending:   Physician Attestation Statement for Scribe #1: I, Zackary Barroso MD, personally performed the services described in this documentation, as scribed by Enid Bojorquez, in my presence, and it is both accurate and complete.           Clinical Impression       ICD-10-CM ICD-9-CM   1. Chest pain  R07.9 786.50       Disposition:   Disposition: Discharged  Condition: Stable         Zackary Barroso MD  12/07/20 0116

## 2020-12-09 ENCOUNTER — OFFICE VISIT (OUTPATIENT)
Dept: ALLERGY | Facility: CLINIC | Age: 40
End: 2020-12-09
Payer: COMMERCIAL

## 2020-12-09 VITALS
HEIGHT: 62 IN | TEMPERATURE: 99 F | BODY MASS INDEX: 27.67 KG/M2 | DIASTOLIC BLOOD PRESSURE: 78 MMHG | SYSTOLIC BLOOD PRESSURE: 115 MMHG | HEART RATE: 99 BPM | WEIGHT: 150.38 LBS

## 2020-12-09 DIAGNOSIS — F41.9 ANXIETY: ICD-10-CM

## 2020-12-09 DIAGNOSIS — K52.81 EOSINOPHILIC GASTRITIS: Primary | ICD-10-CM

## 2020-12-09 PROCEDURE — 99999 PR PBB SHADOW E&M-EST. PATIENT-LVL III: ICD-10-PCS | Mod: PBBFAC,,, | Performed by: ALLERGY & IMMUNOLOGY

## 2020-12-09 PROCEDURE — 3008F BODY MASS INDEX DOCD: CPT | Mod: CPTII,S$GLB,, | Performed by: ALLERGY & IMMUNOLOGY

## 2020-12-09 PROCEDURE — 3074F PR MOST RECENT SYSTOLIC BLOOD PRESSURE < 130 MM HG: ICD-10-PCS | Mod: CPTII,S$GLB,, | Performed by: ALLERGY & IMMUNOLOGY

## 2020-12-09 PROCEDURE — 99214 PR OFFICE/OUTPT VISIT, EST, LEVL IV, 30-39 MIN: ICD-10-PCS | Mod: S$GLB,,, | Performed by: ALLERGY & IMMUNOLOGY

## 2020-12-09 PROCEDURE — 3074F SYST BP LT 130 MM HG: CPT | Mod: CPTII,S$GLB,, | Performed by: ALLERGY & IMMUNOLOGY

## 2020-12-09 PROCEDURE — 3078F PR MOST RECENT DIASTOLIC BLOOD PRESSURE < 80 MM HG: ICD-10-PCS | Mod: CPTII,S$GLB,, | Performed by: ALLERGY & IMMUNOLOGY

## 2020-12-09 PROCEDURE — 99214 OFFICE O/P EST MOD 30 MIN: CPT | Mod: S$GLB,,, | Performed by: ALLERGY & IMMUNOLOGY

## 2020-12-09 PROCEDURE — 3078F DIAST BP <80 MM HG: CPT | Mod: CPTII,S$GLB,, | Performed by: ALLERGY & IMMUNOLOGY

## 2020-12-09 PROCEDURE — 99999 PR PBB SHADOW E&M-EST. PATIENT-LVL III: CPT | Mod: PBBFAC,,, | Performed by: ALLERGY & IMMUNOLOGY

## 2020-12-09 PROCEDURE — 3008F PR BODY MASS INDEX (BMI) DOCUMENTED: ICD-10-PCS | Mod: CPTII,S$GLB,, | Performed by: ALLERGY & IMMUNOLOGY

## 2020-12-09 NOTE — PATIENT INSTRUCTIONS
Dust mites are microscopic insect-like pests that live in house dust. They feed on dead skin or dander that are shed by people and pets.They can worsen asthma and allergies. Dust mites live in mattresses, bedding, upholstered furniture, carpets, and curtains in your home. No matter how clean a home is, dust mites cannot be completely eliminated. A number of things can be done to reduce the number of dust mites:  -Use a dehumidifier or air conditioner to maintain humidity levels at, or below, 50 percent.  -Encase mattress and pillows in dust mite- proof or allergen impermeable covers.  -Wash all bedding and blankets once a week in hot water, 130 to 140 degrees Fahrenheit, to kill dust mites. Non- washable bedding can be frozen overnight.  -Replace wool or feathered bedding products with synthetic materials, and traditional stuffed animals with washable ones.  -In bedrooms, replace wall to wall carpeting with bare floors, and remove fabric curtains and upholstered furniture, whenever possible.  -Use a damp mop or rag to remove dust. Never use a dry cloth, as it stirs up allergens.  -Use a double-layered microfilter bag or a HEPA filter in your vacuum .  -Wear a mask while vacuuming, and stay out of the vacuuming area for 20 minutes after vacuuming, to allow dust and allergens to settle.      Recommend avoiding wheat and dairy.    Recommend she restart Protonix.  Recommend continued avoidance of wheat and dairy.  Recommend Celexa or clinical therapist.

## 2020-12-09 NOTE — PROGRESS NOTES
"Subjective:       Patient ID: Renee Ruelas is a 40 y.o. female.    Chief Complaint:  Follow-up      HPI 11/25/2020: 39 year old female with a history of Eosinophilic gastritis- 69 per HPF eosinophils in gastric biopsy. She denies a history of food allergy. She reports that the endoscopy was done because she reported feeling short of breath when she eats. She denies a history of allergy testing. She denies runny nose, nasal congestion, watery eyes. She denies a history of hayfever.    HPI today:  She reports feel the same.  "I don't know if it is my anxiety or not".  She went to the ER for shortness of breath and was diagnosed anxiety.  She takes Xanax prn. She is without work and her  is an alcoholic.  She reports feeling pressure when she swallows.  She denies coughing or wheezing.  She denies dysphagia.  She avoids milk and wheat.  She was taking Protonix and she stopped when she changed to an alkaline diet.        Past Medical History:   Diagnosis Date    Anxiety     Esophageal stricture     GERD (gastroesophageal reflux disease)     Migraine headache      Family History   Problem Relation Age of Onset    Diabetes Father     Kidney disease Father     Lymphoma Mother     Colon cancer Neg Hx     Colon polyps Neg Hx     Esophageal cancer Neg Hx     Irritable bowel syndrome Neg Hx     Inflammatory bowel disease Neg Hx     Stomach cancer Neg Hx      Current Outpatient Medications on File Prior to Visit   Medication Sig Dispense Refill    ALPRAZolam (XANAX) 0.5 MG tablet TAKE 1 TABLET BY MOUTH BID AS NEEDED FOR ANXIETY      meclizine (ANTIVERT) 25 mg tablet       valACYclovir (VALTREX) 500 MG tablet TK 1 T PO BID FOR 5 DAYS      [DISCONTINUED] ranitidine (ZANTAC) 150 MG tablet Take 1 tablet (150 mg total) by mouth nightly. 30 tablet 11     No current facility-administered medications on file prior to visit.      Review of patient's allergies indicates:   Allergen Reactions    Sulfur " Swelling       Environmental History: No pets. She is not a smoker.  Review of Systems   Constitutional: Negative for chills and fever.   HENT: Negative for congestion and rhinorrhea.    Eyes: Negative for discharge.   Respiratory: Positive for chest tightness and shortness of breath. Negative for cough and wheezing.    Cardiovascular: Negative for chest pain and leg swelling.   Gastrointestinal: Negative for nausea and vomiting.   Skin: Negative for rash and wound.   Allergic/Immunologic: Negative for environmental allergies and food allergies.   Neurological: Negative for facial asymmetry and speech difficulty.   Hematological: Negative for adenopathy. Does not bruise/bleed easily.   Psychiatric/Behavioral: Negative for behavioral problems and suicidal ideas.        Objective:    Physical Exam  Vitals signs reviewed.   Constitutional:       General: She is not in acute distress.     Appearance: Normal appearance. She is well-developed. She is not ill-appearing, toxic-appearing or diaphoretic.   HENT:      Head: Normocephalic and atraumatic.      Right Ear: Tympanic membrane, ear canal and external ear normal. There is no impacted cerumen.      Left Ear: Tympanic membrane, ear canal and external ear normal. There is no impacted cerumen.      Nose: Nose normal. No congestion or rhinorrhea.      Mouth/Throat:      Mouth: Mucous membranes are moist.      Pharynx: Oropharynx is clear. No oropharyngeal exudate or posterior oropharyngeal erythema.   Eyes:      General: No scleral icterus.        Right eye: No discharge.         Left eye: No discharge.      Pupils: Pupils are equal, round, and reactive to light.   Neck:      Musculoskeletal: Normal range of motion and neck supple. No neck rigidity or muscular tenderness.      Thyroid: No thyromegaly.   Cardiovascular:      Rate and Rhythm: Normal rate and regular rhythm.      Heart sounds: Normal heart sounds. No murmur. No friction rub. No gallop.    Pulmonary:       Effort: Pulmonary effort is normal. No respiratory distress.      Breath sounds: Normal breath sounds. No stridor. No wheezing, rhonchi or rales.   Chest:      Chest wall: No tenderness.   Abdominal:      General: Bowel sounds are normal. There is no distension.      Palpations: Abdomen is soft. There is no mass.      Tenderness: There is no abdominal tenderness. There is no guarding or rebound.      Hernia: No hernia is present.   Musculoskeletal: Normal range of motion.         General: No swelling, tenderness, deformity or signs of injury.      Right lower leg: No edema.      Left lower leg: No edema.   Lymphadenopathy:      Cervical: No cervical adenopathy.   Skin:     General: Skin is warm.      Coloration: Skin is not jaundiced or pale.      Findings: No bruising, erythema or lesion.   Neurological:      Mental Status: She is alert and oriented to person, place, and time.      Gait: Gait normal.   Psychiatric:         Mood and Affect: Mood normal.         Behavior: Behavior normal.         Thought Content: Thought content normal.         Judgment: Judgment normal.           Assessment:       1. Eosinophilic gastritis    2. Anxiety         Plan:       Eosinophilic gastritis    Anxiety    Reviewed labs.  Allergy avoidance measures given.  Recommend avoiding wheat and dairy.    Recommend she restart Protonix.  Recommend continued avoidance of wheat and dairy.  Recommend Celexa or clinical therapist.  RTC 6 weeks or sooner, if needed.      MARYBEL LIZARRAGA

## 2020-12-14 ENCOUNTER — OFFICE VISIT (OUTPATIENT)
Dept: GASTROENTEROLOGY | Facility: CLINIC | Age: 40
End: 2020-12-14
Payer: COMMERCIAL

## 2020-12-14 VITALS — BODY MASS INDEX: 27.25 KG/M2 | WEIGHT: 146.63 LBS

## 2020-12-14 DIAGNOSIS — K52.81 EOSINOPHILIC GASTRITIS: ICD-10-CM

## 2020-12-14 DIAGNOSIS — Z00.00 HEALTHCARE MAINTENANCE: Primary | ICD-10-CM

## 2020-12-14 PROCEDURE — 99999 PR PBB SHADOW E&M-EST. PATIENT-LVL III: CPT | Mod: PBBFAC,,, | Performed by: INTERNAL MEDICINE

## 2020-12-14 PROCEDURE — 99999 PR PBB SHADOW E&M-EST. PATIENT-LVL III: ICD-10-PCS | Mod: PBBFAC,,, | Performed by: INTERNAL MEDICINE

## 2020-12-14 PROCEDURE — 99213 PR OFFICE/OUTPT VISIT, EST, LEVL III, 20-29 MIN: ICD-10-PCS | Mod: S$GLB,,, | Performed by: INTERNAL MEDICINE

## 2020-12-14 PROCEDURE — 3008F PR BODY MASS INDEX (BMI) DOCUMENTED: ICD-10-PCS | Mod: CPTII,S$GLB,, | Performed by: INTERNAL MEDICINE

## 2020-12-14 PROCEDURE — 3008F BODY MASS INDEX DOCD: CPT | Mod: CPTII,S$GLB,, | Performed by: INTERNAL MEDICINE

## 2020-12-14 PROCEDURE — 99213 OFFICE O/P EST LOW 20 MIN: CPT | Mod: S$GLB,,, | Performed by: INTERNAL MEDICINE

## 2020-12-14 NOTE — PROGRESS NOTES
Subjective:       Patient ID: Renee Ruelas is a 40 y.o. female.    Chief Complaint: F/u    This is a 40-year-old female here for a follow-up visit initially seen with reflux symptoms described as a substernal burning sensation which was acute in nature, moderate intensity with incomplete relief with PPI therapy.  Prior imaging had noted some possible esophageal spasm with reflux.  Endoscopic evaluation in 2017 was noting of a normal esophagus, only fundic gland polyps on biopsy.  A repeat upper endoscopy was done with the below listed pathology, with 69 eosinophils per high-power field in the gastric biopsies.  Currently no n/v or abdominal pain. Allergy testing has been completed. She has been doing an elimination diet.      EGD Pathology  .1. Stomach (biopsy):  - Benign gastric mucosa, with focal active gastritis and up to 69 eosinophils per high-power field (see  comment)  - H. pylori immunostain is negative; H. pylori stain with appropriate controls  - No intestinal metaplasia, dysplasia or malignancy  2. Distal Esophagus (biopsy):  - Benign squamous mucosa, no histologic abnormality  - Zero intraepithelial eosinophils  3. Proximal Esophagus (biopsy):  - Benign squamous mucosa, no histologic abnormality  - Zero intraepithelial eosinophils    The following portions of the patient's history were reviewed and updated as appropriate: allergies, current medications, past family history, past medical history, past social history, past surgical history and problem list.    (Portions of this note were dictated using voice recognition software and may contain dictation related errors in spelling/grammar/syntax not found on text review)  HPI  Review of Systems   Constitutional: Negative for fever and unexpected weight change.   Respiratory: Positive for shortness of breath. Negative for cough.    Cardiovascular: Negative for chest pain and palpitations.   Gastrointestinal: Negative for abdominal distention and  abdominal pain.         Objective:      Physical Exam  Vitals signs and nursing note reviewed.   Constitutional:       General: She is not in acute distress.     Appearance: Normal appearance. She is well-developed. She is not diaphoretic.   HENT:      Head: Normocephalic and atraumatic.   Eyes:      General: No scleral icterus.     Conjunctiva/sclera: Conjunctivae normal.   Neck:      Musculoskeletal: Normal range of motion and neck supple.      Thyroid: No thyromegaly.      Trachea: No tracheal deviation.   Pulmonary:      Effort: Pulmonary effort is normal. No respiratory distress.   Abdominal:      General: There is no distension.      Palpations: Abdomen is soft.   Skin:     General: Skin is warm and dry.      Coloration: Skin is not jaundiced or pale.   Neurological:      General: No focal deficit present.      Mental Status: She is alert and oriented to person, place, and time.   Psychiatric:         Mood and Affect: Mood normal.         Behavior: Behavior normal.           Labs/imaging; reviewed  Assessment:       1. Healthcare maintenance    2. Eosinophilic gastritis        Plan:   1. Agree with PPI; newer symptoms would be atypical of eosinophilic gastritis. Discussed elimination diets, allergy testing results and indications for steroids and associated conditions  2. Happy to f/u with her at any time and she will contact us if symptoms develop or change  3. PCP referral placed to w/u SOB

## 2020-12-17 ENCOUNTER — OFFICE VISIT (OUTPATIENT)
Dept: INTERNAL MEDICINE | Facility: CLINIC | Age: 40
End: 2020-12-17
Payer: COMMERCIAL

## 2020-12-17 VITALS
HEART RATE: 86 BPM | TEMPERATURE: 98 F | HEIGHT: 62 IN | DIASTOLIC BLOOD PRESSURE: 86 MMHG | BODY MASS INDEX: 27.22 KG/M2 | OXYGEN SATURATION: 99 % | SYSTOLIC BLOOD PRESSURE: 128 MMHG | WEIGHT: 147.94 LBS

## 2020-12-17 DIAGNOSIS — F41.9 ANXIETY: ICD-10-CM

## 2020-12-17 DIAGNOSIS — R07.9 CHEST PAIN, UNSPECIFIED TYPE: ICD-10-CM

## 2020-12-17 DIAGNOSIS — G47.9 SLEEP DISTURBANCE: ICD-10-CM

## 2020-12-17 DIAGNOSIS — R06.02 SHORTNESS OF BREATH: Primary | ICD-10-CM

## 2020-12-17 PROCEDURE — 99999 PR PBB SHADOW E&M-EST. PATIENT-LVL V: ICD-10-PCS | Mod: PBBFAC,,, | Performed by: PHYSICIAN ASSISTANT

## 2020-12-17 PROCEDURE — 99999 PR PBB SHADOW E&M-EST. PATIENT-LVL V: CPT | Mod: PBBFAC,,, | Performed by: PHYSICIAN ASSISTANT

## 2020-12-17 PROCEDURE — 99204 PR OFFICE/OUTPT VISIT, NEW, LEVL IV, 45-59 MIN: ICD-10-PCS | Mod: S$PBB,,, | Performed by: PHYSICIAN ASSISTANT

## 2020-12-17 PROCEDURE — 99204 OFFICE O/P NEW MOD 45 MIN: CPT | Mod: S$PBB,,, | Performed by: PHYSICIAN ASSISTANT

## 2020-12-17 RX ORDER — ALBUTEROL SULFATE 90 UG/1
2 AEROSOL, METERED RESPIRATORY (INHALATION) EVERY 6 HOURS PRN
Qty: 8 G | Refills: 0 | Status: SHIPPED | OUTPATIENT
Start: 2020-12-17 | End: 2021-01-27 | Stop reason: SDUPTHER

## 2020-12-18 ENCOUNTER — OFFICE VISIT (OUTPATIENT)
Dept: PULMONOLOGY | Facility: CLINIC | Age: 40
End: 2020-12-18
Payer: COMMERCIAL

## 2020-12-18 VITALS
SYSTOLIC BLOOD PRESSURE: 132 MMHG | BODY MASS INDEX: 27.34 KG/M2 | DIASTOLIC BLOOD PRESSURE: 82 MMHG | OXYGEN SATURATION: 99 % | HEIGHT: 62 IN | WEIGHT: 148.56 LBS | HEART RATE: 93 BPM

## 2020-12-18 DIAGNOSIS — R06.02 SHORTNESS OF BREATH: Primary | ICD-10-CM

## 2020-12-18 PROCEDURE — 99999 PR PBB SHADOW E&M-EST. PATIENT-LVL III: ICD-10-PCS | Mod: PBBFAC,,, | Performed by: NURSE PRACTITIONER

## 2020-12-18 PROCEDURE — 99204 OFFICE O/P NEW MOD 45 MIN: CPT | Mod: S$PBB,,, | Performed by: NURSE PRACTITIONER

## 2020-12-18 PROCEDURE — 99204 PR OFFICE/OUTPT VISIT, NEW, LEVL IV, 45-59 MIN: ICD-10-PCS | Mod: S$PBB,,, | Performed by: NURSE PRACTITIONER

## 2020-12-18 PROCEDURE — 99999 PR PBB SHADOW E&M-EST. PATIENT-LVL III: CPT | Mod: PBBFAC,,, | Performed by: NURSE PRACTITIONER

## 2020-12-18 NOTE — PATIENT INSTRUCTIONS
We will get breathing tests on you    We will get a walking test on you    Keep your appointments with cardiology and sleep medicine

## 2020-12-18 NOTE — PROGRESS NOTES
"Subjective:       Patient ID: Renee Ruelas is a 40 y.o. female.    Chief Complaint: Shortness of breath  HPI:   Renee Ruelas is a 40 y.o. female who presents with evaluation for shortness of breath.    She is a never smoker and has no history of asthma or lung disease.  Her son does have asthma.    SOB started 1 month ago, started suddenly. No exacerbating factors  Xanax relieves her shortness of breath, but then it comes right back once the medicine wears off. She must "jump up" out of her sleep due to shortness of breath.  She reports that her  has told her that she snores, but this predates her symptoms.  She has been losing weight due to her shortness of breath as she is unable to tolerate eating.  She got on an alkaline diet in July and lost some weight but this is not related.  She stopped the alkaline diet before the symptoms started. Symptoms are unpredictable.  Most of the time it happens when she is driving.   Just got albuterol, tried it this morning and it didn't help.   Vertigo in the past, uses meclizine if she needs it.   Symptoms have not happened before  Has had an extensive workup in the past- lab work has been uploaded.  The provider she saw looked at the workup and noted that her cholesterol was high but other wise rather good.     She was prescribed an albuterol inhaler yesterday and tried it this morning with no relief.     She was prescribed xanax in the past after having severe issues with reflux.  She had difficulty swallowing which improved significantly with taking xanax.  The prescription is from past encounters     She is concerned that the cause of her symptoms has remained elusive and is concerned why every new provider she sees orders more tests.    She does not remember any illness or specific events that occurred prior to the onset of her symptoms.    Review of Systems   Constitutional: Positive for weight loss. Negative for chills, activity change, fatigue " and night sweats.   HENT: Positive for postnasal drip and congestion. Negative for rhinorrhea and trouble swallowing.    Eyes: Negative for itching.   Respiratory: Positive for chest tightness and shortness of breath. Negative for cough, hemoptysis, sputum production, choking, wheezing, dyspnea on extertion and use of rescue inhaler.    Cardiovascular: Positive for chest pain. Negative for palpitations.   Genitourinary: Negative for difficulty urinating.   Endocrine: Negative for cold intolerance and heat intolerance.    Musculoskeletal: Negative for arthralgias.   Skin: Negative for rash.   Gastrointestinal: Negative for nausea, vomiting and acid reflux.   Neurological: Positive for dizziness and light-headedness.   Hematological: Negative for adenopathy.   Psychiatric/Behavioral: Positive for sleep disturbance. The patient is nervous/anxious.          Social History     Tobacco Use    Smoking status: Never Smoker    Smokeless tobacco: Never Used   Substance Use Topics    Alcohol use: No       Review of patient's allergies indicates:   Allergen Reactions    Sulfur Swelling     Past Medical History:   Diagnosis Date    Anxiety     Esophageal stricture     GERD (gastroesophageal reflux disease)     Migraine headache      Past Surgical History:   Procedure Laterality Date    ESOPHAGEAL DILATION      ESOPHAGOGASTRODUODENOSCOPY      ESOPHAGOGASTRODUODENOSCOPY N/A 6/30/2020    Procedure: ESOPHAGOGASTRODUODENOSCOPY (EGD);  Surgeon: Kishor Cordero MD;  Location: [facility];  Service: Endoscopy;  Laterality: N/A;  NEEDS RAPID COVID 19 TEST--PT COMING FROM [place]     Current Outpatient Medications on File Prior to Visit   Medication Sig    albuterol (PROVENTIL/VENTOLIN HFA) 90 mcg/actuation inhaler Inhale 2 puffs into the lungs every 6 (six) hours as needed for Wheezing.    ALPRAZolam (XANAX) 0.5 MG tablet TAKE 1 TABLET BY MOUTH BID AS NEEDED FOR ANXIETY    meclizine (ANTIVERT) 25 mg tablet      valACYclovir (VALTREX) 500 MG tablet TK 1 T PO BID FOR 5 DAYS    [DISCONTINUED] ranitidine (ZANTAC) 150 MG tablet Take 1 tablet (150 mg total) by mouth nightly.     No current facility-administered medications on file prior to visit.        Objective:      There were no vitals filed for this visit.  Physical Exam   Constitutional: She is oriented to person, place, and time. She appears well-developed and well-nourished. No distress.   HENT:   Head: Normocephalic.   Neck: Normal range of motion. Neck supple.   Cardiovascular: Normal rate and regular rhythm.   No murmur heard.  Pulmonary/Chest: Normal expansion, symmetric chest wall expansion, effort normal and breath sounds normal. No respiratory distress. She has no decreased breath sounds. She has no wheezes. She has no rhonchi. She has no rales.   Abdominal: Soft. She exhibits no distension. There is no hepatosplenomegaly. There is no abdominal tenderness.   Musculoskeletal: Normal range of motion.         General: No edema.   Lymphadenopathy:     She has no cervical adenopathy.     She has no axillary adenopathy.   Neurological: She is alert and oriented to person, place, and time. Gait normal.   Skin: Skin is warm and dry. She is not diaphoretic. No cyanosis or erythema. Nails show no clubbing.   Psychiatric: She has a normal mood and affect.   Nursing note and vitals reviewed.    Personal Diagnostic Review    Imaging personally reviewed with patient        Assessment:     Problem List Items Addressed This Visit        Other    Shortness of breath - Primary    Overview     Unclear etiology  CT chest clear, no parenchymal abnormalities that I suspect are contributing to her symptoms.  Await PFTs and walk  Continue alprazolam as now as this is the only alleviating factor she has noted.          Current Assessment & Plan     Walk in [place] showed a desaturation from 99% to 96%.  Patient reports no symptoms but also noted that she had taken her medication prior to  leaving [place] to [place] to this appointment.   Await studies- she would prefer to have them completed closer to home.  She would also prefer to follow up in the [place] area.  I suspect that anxiety is contributing to her symptoms.  She has an appt with a MH provider upcoming which I encourage her to keep.  Addressed her concerns about the need for additional testing to the best of my ability.  As SOB and chest pain can evolve from a variety of causes further testing is our best recourse for finding a diagnosis and treatment.         Relevant Orders    Complete PFT w/ bronchodilator    Six Minute Walk Test to qualify for Home Oxygen

## 2020-12-18 NOTE — LETTER
December 18, 2020      Jolene Sahu PA-C  [address] [place] [address]           [facility] - Pulmonology [facility] [address]  [place] [address]  Phone: [phone]  Fax: [phone]          Patient: Renee Ruelas   MR Number: [id number]   YOB: 1980   Date of Visit: 12/18/2020       Dear Jolene Sahu:    Thank you for referring Renee Ruelas to me for evaluation. Attached you will find relevant portions of my assessment and plan of care.    If you have questions, please do not hesitate to call me. I look forward to following Renee Ruelas along with you.    Sincerely,    Beatriz Alegre, DNP    Enclosure  CC:  No Recipients    If you would like to receive this communication electronically, please contact [email or web address] or [phone] to request more information on [employer] access.    For providers and/or their staff who would like to refer a patient to [facility], please contact us through our one-stop-shop provider referral line, [facility], at [phone].    If you feel you have received this communication in error or would no longer like to receive these types of communications, please e-mail [email or web address]

## 2020-12-18 NOTE — ASSESSMENT & PLAN NOTE
Walk in [place] showed a desaturation from 99% to 96%.  Patient reports no symptoms but also noted that she had taken her medication prior to leaving [place] to [place] to this appointment.   Await studies- she would prefer to have them completed closer to home.  She would also prefer to follow up in the [place] area.  I suspect that anxiety is contributing to her symptoms.  She has an appt with a MH provider upcoming which I encourage her to keep.  Addressed her concerns about the need for additional testing to the best of my ability.  As SOB and chest pain can evolve from a variety of causes further testing is our best recourse for finding a diagnosis and treatment.

## 2020-12-21 ENCOUNTER — TELEPHONE (OUTPATIENT)
Dept: GASTROENTEROLOGY | Facility: CLINIC | Age: 40
End: 2020-12-21

## 2020-12-22 ENCOUNTER — OFFICE VISIT (OUTPATIENT)
Dept: GASTROENTEROLOGY | Facility: CLINIC | Age: 40
End: 2020-12-22
Payer: COMMERCIAL

## 2020-12-22 ENCOUNTER — TELEPHONE (OUTPATIENT)
Dept: GASTROENTEROLOGY | Facility: CLINIC | Age: 40
End: 2020-12-22

## 2020-12-22 ENCOUNTER — OFFICE VISIT (OUTPATIENT)
Dept: CARDIOLOGY | Facility: CLINIC | Age: 40
End: 2020-12-22
Payer: COMMERCIAL

## 2020-12-22 VITALS
WEIGHT: 144.19 LBS | HEIGHT: 62 IN | DIASTOLIC BLOOD PRESSURE: 70 MMHG | RESPIRATION RATE: 16 BRPM | SYSTOLIC BLOOD PRESSURE: 106 MMHG | HEART RATE: 89 BPM | BODY MASS INDEX: 26.53 KG/M2 | OXYGEN SATURATION: 98 %

## 2020-12-22 VITALS
WEIGHT: 144.19 LBS | BODY MASS INDEX: 26.53 KG/M2 | HEART RATE: 89 BPM | HEIGHT: 62 IN | DIASTOLIC BLOOD PRESSURE: 70 MMHG | SYSTOLIC BLOOD PRESSURE: 160 MMHG

## 2020-12-22 DIAGNOSIS — F41.9 ANXIETY: Primary | ICD-10-CM

## 2020-12-22 DIAGNOSIS — K52.81 EOSINOPHILIC GASTRITIS: ICD-10-CM

## 2020-12-22 DIAGNOSIS — R06.02 SHORTNESS OF BREATH: ICD-10-CM

## 2020-12-22 DIAGNOSIS — F41.9 ANXIETY: ICD-10-CM

## 2020-12-22 DIAGNOSIS — K21.9 GASTROESOPHAGEAL REFLUX DISEASE, UNSPECIFIED WHETHER ESOPHAGITIS PRESENT: ICD-10-CM

## 2020-12-22 DIAGNOSIS — R07.9 CHEST PAIN, UNSPECIFIED TYPE: Primary | ICD-10-CM

## 2020-12-22 PROBLEM — R13.10 DYSPHAGIA: Status: RESOLVED | Noted: 2017-03-08 | Resolved: 2020-12-22

## 2020-12-22 PROCEDURE — 99214 PR OFFICE/OUTPT VISIT, EST, LEVL IV, 30-39 MIN: ICD-10-PCS | Mod: S$PBB,,, | Performed by: INTERNAL MEDICINE

## 2020-12-22 PROCEDURE — 99245 OFF/OP CONSLTJ NEW/EST HI 55: CPT | Mod: S$PBB,,, | Performed by: INTERNAL MEDICINE

## 2020-12-22 PROCEDURE — 99214 OFFICE O/P EST MOD 30 MIN: CPT | Mod: S$PBB,,, | Performed by: INTERNAL MEDICINE

## 2020-12-22 PROCEDURE — 99999 PR PBB SHADOW E&M-EST. PATIENT-LVL III: ICD-10-PCS | Mod: PBBFAC,,, | Performed by: INTERNAL MEDICINE

## 2020-12-22 PROCEDURE — 99999 PR PBB SHADOW E&M-EST. PATIENT-LVL IV: ICD-10-PCS | Mod: PBBFAC,,, | Performed by: INTERNAL MEDICINE

## 2020-12-22 PROCEDURE — 99999 PR PBB SHADOW E&M-EST. PATIENT-LVL IV: CPT | Mod: PBBFAC,,, | Performed by: INTERNAL MEDICINE

## 2020-12-22 PROCEDURE — 99999 PR PBB SHADOW E&M-EST. PATIENT-LVL III: CPT | Mod: PBBFAC,,, | Performed by: INTERNAL MEDICINE

## 2020-12-22 PROCEDURE — 99245 PR OFFICE CONSULTATION,LEVEL V: ICD-10-PCS | Mod: S$PBB,,, | Performed by: INTERNAL MEDICINE

## 2020-12-22 RX ORDER — COLCHICINE 0.6 MG/1
0.6 TABLET ORAL 2 TIMES DAILY
Qty: 60 TABLET | Refills: 1 | Status: SHIPPED | OUTPATIENT
Start: 2020-12-22 | End: 2021-04-23

## 2020-12-22 RX ORDER — IBUPROFEN 600 MG/1
600 TABLET ORAL 3 TIMES DAILY
Qty: 42 TABLET | Refills: 0 | Status: SHIPPED | OUTPATIENT
Start: 2020-12-22 | End: 2021-01-05

## 2020-12-22 NOTE — PROGRESS NOTES
[facility]  Gastroenterology    PCP: Jose Angel Tobar MD    12/22/20    HPI     Chest Pain      Additional comments: x 1 month              Shortness of Breath      Additional comments: feels like she cannot breathe when swallowing          Last edited by Ronny Laws LPN on 12/22/2020  3:57 PM. (History)      Reason for Visit: Shortness of Breath, GERD    Subjective:   Renee Ruelas is a 40 y.o. female with PMH of anxiety, GERD and recently diagnosed eosinophilic gastritis who presents for evaluation. Patient was previously following with another gastroenterologist. She had an EGD in 06/2020 which showed a normal esophagus and biopsies were negative for EOE however gastric biopsies showed elevated eosinophils suggestive of eosinophilic gastritis. She was sent to allergy immunology and had allergy testing done and was also started on an elimination diet. She has been having symptoms of shortness of breath and chest discomfort. She saw cardiology earlier today and is going to be empirically treated for pericarditis with Motrin and colchicine. She has been concerned about her SOB, as she feels it worsens when she swallows so she cannot eat right. She just started some Protonix again today. She does not have any trouble swallowing. She also feels some of this may be related to anxiety and her Xanax has been helping somewhat. She is concerned because she only has a few Xanax pills left and does not have any current primary care or psychiatrist to refill them.     Past Medical History:   Diagnosis Date    Anxiety     Esophageal stricture     GERD (gastroesophageal reflux disease)     Migraine headache        Past Surgical History:   Procedure Laterality Date    ESOPHAGEAL DILATION      ESOPHAGOGASTRODUODENOSCOPY      ESOPHAGOGASTRODUODENOSCOPY N/A 6/30/2020    Procedure: ESOPHAGOGASTRODUODENOSCOPY (EGD);  Surgeon: Kishor Cordero MD;  Location: [facility];  Service: Endoscopy;   Laterality: N/A;  NEEDS RAPID COVID 19 TEST--PT COMING FROM [place]       Current Outpatient Medications on File Prior to Visit   Medication Sig Dispense Refill    albuterol (PROVENTIL/VENTOLIN HFA) 90 mcg/actuation inhaler Inhale 2 puffs into the lungs every 6 (six) hours as needed for Wheezing. 8 g 0    ALPRAZolam (XANAX) 0.5 MG tablet TAKE 1 TABLET BY MOUTH BID AS NEEDED FOR ANXIETY      colchicine (COLCRYS) 0.6 mg tablet Take 1 tablet (0.6 mg total) by mouth 2 (two) times daily. 60 tablet 1    ibuprofen (ADVIL,MOTRIN) 600 MG tablet Take 1 tablet (600 mg total) by mouth 3 (three) times daily. for 14 days 42 tablet 0    meclizine (ANTIVERT) 25 mg tablet       valACYclovir (VALTREX) 500 MG tablet TK 1 T PO BID FOR 5 DAYS      [DISCONTINUED] ranitidine (ZANTAC) 150 MG tablet Take 1 tablet (150 mg total) by mouth nightly. 30 tablet 11     No current facility-administered medications on file prior to visit.        Review of patient's allergies indicates:   Allergen Reactions    Sulfur Swelling       Social History     Socioeconomic History    Marital status:      Spouse name: Not on file    Number of children: Not on file    Years of education: Not on file    Highest education level: Not on file   Occupational History    Not on file   Social Needs    Financial resource strain: Not on file    Food insecurity     Worry: Not on file     Inability: Not on file    Transportation needs     Medical: Not on file     Non-medical: Not on file   Tobacco Use    Smoking status: Never Smoker    Smokeless tobacco: Never Used   Substance and Sexual Activity    Alcohol use: No    Drug use: No    Sexual activity: Yes     Partners: Male   Lifestyle    Physical activity     Days per week: Not on file     Minutes per session: Not on file    Stress: Not on file   Relationships    Social connections     Talks on phone: Not on file     Gets together: Not on file     Attends Gnosticist service: Not on file      Active member of club or organization: Not on file     Attends meetings of clubs or organizations: Not on file     Relationship status: Not on file   Other Topics Concern    Not on file   Social History Narrative    Not on file       Family History   Problem Relation Age of Onset    Diabetes Father     Kidney disease Father     Hypertension Father     Lymphoma Mother     Colon cancer Neg Hx     Colon polyps Neg Hx     Esophageal cancer Neg Hx     Irritable bowel syndrome Neg Hx     Inflammatory bowel disease Neg Hx     Stomach cancer Neg Hx        Review of Systems   Constitutional: Negative for appetite change, fever and unexpected weight change.   HENT: Negative for postnasal drip, rhinorrhea, sneezing, sore throat and trouble swallowing.    Eyes: Negative for visual disturbance.   Respiratory: Positive for shortness of breath. Negative for cough and wheezing.    Cardiovascular: Negative for chest pain, palpitations and leg swelling.   Gastrointestinal: Negative for abdominal pain, blood in stool, constipation, diarrhea, nausea and vomiting.   Genitourinary: Negative for dysuria.   Musculoskeletal: Negative for arthralgias, joint swelling and myalgias.   Skin: Negative for color change, pallor and rash.   Neurological: Negative for weakness, light-headedness, numbness and headaches.   Hematological: Negative for adenopathy. Does not bruise/bleed easily.   Psychiatric/Behavioral: Negative for agitation.           Objective:   Vitals:   Vitals:    12/22/20 1557   BP: (!) 160/70   Pulse: 89       Physical Exam  Vitals signs reviewed.   Constitutional:       General: She is not in acute distress.     Appearance: She is not diaphoretic.   HENT:      Head: Normocephalic and atraumatic.      Mouth/Throat:      Pharynx: No oropharyngeal exudate.   Eyes:      General: No scleral icterus.        Right eye: No discharge.         Left eye: No discharge.      Conjunctiva/sclera: Conjunctivae normal.      Pupils:  Pupils are equal, round, and reactive to light.   Neck:      Musculoskeletal: Normal range of motion.   Cardiovascular:      Rate and Rhythm: Normal rate and regular rhythm.      Heart sounds: Normal heart sounds. No murmur. No friction rub. No gallop.       Comments: Symptoms reproduced with leaning forward  Pulmonary:      Effort: Pulmonary effort is normal. No respiratory distress.      Breath sounds: Normal breath sounds. No stridor. No wheezing or rales.      Comments: Symptoms reproduced with deep inspiration  Abdominal:      General: Bowel sounds are normal. There is no distension.      Palpations: Abdomen is soft. There is no mass.      Tenderness: There is no abdominal tenderness. There is no guarding.   Musculoskeletal: Normal range of motion.   Skin:     General: Skin is warm and dry.      Coloration: Skin is not pale.      Findings: No erythema or rash.   Neurological:      Mental Status: She is alert and oriented to person, place, and time.           Cta Chest Non-coronary (pe Study)    Result Date: 12/6/2020  EXAMINATION: CTA CHEST NON CORONARY CLINICAL HISTORY: PE suspected, intermediate prob, positive D-dimer; TECHNIQUE: Low dose axial images, sagittal and coronal reformations were obtained from the thoracic inlet to the lung bases following the IV administration of 100 mL of Omnipaque 350.  Contrast timing was optimized to evaluate the pulmonary arteries.  MIP images were performed. COMPARISON: Chest radiograph 12/06/2020. FINDINGS: Base of Neck: Thyroid within normal limits. Thoracic soft tissues: Unremarkable. Aorta: No aneurysm, dissection, or significant stenosis. Heart: Normal size. No effusion. Pulmonary vasculature: Pulmonary arteries are well opacified.  There is no pulmonary thromboembolism. Yue/Mediastinum: No pathologic tito enlargement. Airways: Patent. Lungs/Pleura: Clear lungs. No pleural effusion or thickening. Esophagus: Unremarkable. Upper Abdomen: No abnormality of the partially  imaged upper abdomen. Bones: No acute fracture. No suspicious lytic or sclerotic lesions.     No pulmonary thromboembolism or significant pulmonary opacity.     X-ray Chest Ap Portable    Result Date: 12/6/2020  EXAMINATION: XR CHEST AP PORTABLE CLINICAL HISTORY: Chest Pain; TECHNIQUE: Single frontal view of the chest was performed. COMPARISON: 11/18/2020. FINDINGS: The lungs are clear, with normal appearance of pulmonary vasculature and no pleural effusion or pneumothorax. The cardiac silhouette is normal in size. The hilar and mediastinal contours are unremarkable. Bones are intact.     No acute abnormality.      IMPRESSION     1. Heartburn  2. GERD  3. Eosinophilic Gastritis    PLANS:    - Would continue with the treatment of the pericarditis, as some of the symptoms are concerning for this  - Agree with starting Protonix 40 mg po daily although patient without any typical reflux symptoms at this time. Proper administration instructions discussed  - Strict reflux precautions- caffeine avoidance, HOB elevation and avoidance of eating at least 3 hours before bedtime  - Will place a referral to IM and to psychiatry to help with the anxiety and for refills on her current anxiolytic  - Prior EGD reviewed- no esophageal abnormality noted at that time and no dysphagia at this time. Do not feel a repeat EGD is warranted at this time  - Will have patient RTC in 4-6 weeks once cardiology work-up complete and after PPI trial to f/u on symptoms    Anxiety  -     Ambulatory consult to Psychiatry; Future; Expected date: 12/29/2020    Gastroesophageal reflux disease, unspecified whether esophagitis present    Eosinophilic gastritis        Sabine Sanches MD  Gastroenterology and Hepatology

## 2020-12-22 NOTE — TELEPHONE ENCOUNTER
I was returning patient call regarding message. I see she is seeing another gastroenterologist today.

## 2020-12-22 NOTE — PROGRESS NOTES
"Subjective:   Patient ID:  Renee Ruelas is a 40 y.o. female who presents for cardiac consult of Shortness of Breath and Chest Pain    Referral by: Jolene Sahu PA-C  Reason for consult: CP/SOB    HPI  The patient came in today for cardiac consult of Shortness of Breath and Chest Pain    12/2/20  Renee Ruelas is a 40 y.o. female pt with GERD, esoph stricture, anxiety, migraines presents for CV eval of CP and SOB.    She went to ER 2 weeks prior - PMHx of anxiety, GERD, and esophageal stricture who presents to the Emergency Department for evaluation of mid-sternal CP which onset gradually several weeks ago. Pt reports pain is "uncomfortable." Symptoms are constant and moderate in severity. Sxs exacerbated by lying down. No mitigating factors reported. Associated sxs include SOB.     D dimer was elevated, CTA neg for PE. ECG LVH, nonspec T wave changes.     Symptoms of CP/SOB started in November. She also allergies, takes claritin. She is seeing GI today. Went to another holistic med doc was told she may have inflammation based on some labs, unsure CRP/Sed rate ordered.     Patient feels  no leg swelling, no PND, no syncope, no CNS symptoms.    Patient has fairly good exercise tolerance. 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.     Patient is compliant with medications.    FH - no CV disease    Normal sinus rhythm   Minimal voltage criteria for LVH, may be normal variant   Nonspecific T wave abnormality   Abnormal ECG   When compared with ECG of 18-NOV-2020 14:05,   No significant change was found   Confirmed by JAYLAN MORGAN MD ([id number]) on 12/7/2020 11:59:22 AM     Past Medical History:   Diagnosis Date    Anxiety     Esophageal stricture     GERD (gastroesophageal reflux disease)     Migraine headache        Past Surgical History:   Procedure Laterality Date    ESOPHAGEAL DILATION      ESOPHAGOGASTRODUODENOSCOPY      ESOPHAGOGASTRODUODENOSCOPY N/A 6/30/2020    Procedure: " ESOPHAGOGASTRODUODENOSCOPY (EGD);  Surgeon: Kishor Cordero MD;  Location: [facility];  Service: Endoscopy;  Laterality: N/A;  NEEDS RAPID COVID 19 TEST--PT COMING FROM [place]       Social History     Tobacco Use    Smoking status: Never Smoker    Smokeless tobacco: Never Used   Substance Use Topics    Alcohol use: No    Drug use: No       Family History   Problem Relation Age of Onset    Diabetes Father     Kidney disease Father     Hypertension Father     Lymphoma Mother     Colon cancer Neg Hx     Colon polyps Neg Hx     Esophageal cancer Neg Hx     Irritable bowel syndrome Neg Hx     Inflammatory bowel disease Neg Hx     Stomach cancer Neg Hx        Patient's Medications   New Prescriptions    COLCHICINE (COLCRYS) 0.6 MG TABLET    Take 1 tablet (0.6 mg total) by mouth 2 (two) times daily.    IBUPROFEN (ADVIL,MOTRIN) 600 MG TABLET    Take 1 tablet (600 mg total) by mouth 3 (three) times daily. for 14 days   Previous Medications    ALBUTEROL (PROVENTIL/VENTOLIN HFA) 90 MCG/ACTUATION INHALER    Inhale 2 puffs into the lungs every 6 (six) hours as needed for Wheezing.    ALPRAZOLAM (XANAX) 0.5 MG TABLET    TAKE 1 TABLET BY MOUTH BID AS NEEDED FOR ANXIETY    MECLIZINE (ANTIVERT) 25 MG TABLET        VALACYCLOVIR (VALTREX) 500 MG TABLET    TK 1 T PO BID FOR 5 DAYS   Modified Medications    No medications on file   Discontinued Medications    No medications on file       Review of Systems   Constitutional: Positive for malaise/fatigue.   HENT: Negative.    Eyes: Negative.    Respiratory: Positive for shortness of breath.    Cardiovascular: Positive for chest pain and palpitations.   Gastrointestinal: Negative.    Genitourinary: Negative.    Musculoskeletal: Negative.    Skin: Negative.    Neurological: Positive for dizziness.   Endo/Heme/Allergies: Negative.    Psychiatric/Behavioral: The patient is nervous/anxious.    All 12 systems otherwise negative.      Wt Readings from Last 3 Encounters:  "  12/22/20 65.4 kg (144 lb 2.9 oz)   12/18/20 67.4 kg (148 lb 9.4 oz)   12/17/20 67.1 kg (147 lb 14.9 oz)     Temp Readings from Last 3 Encounters:   12/17/20 97.5 °F (36.4 °C) (Tympanic)   12/09/20 98.7 °F (37.1 °C)   12/06/20 98.3 °F (36.8 °C) (Oral)     BP Readings from Last 3 Encounters:   12/22/20 106/70   12/18/20 132/82   12/17/20 128/86     Pulse Readings from Last 3 Encounters:   12/22/20 89   12/18/20 93   12/17/20 86       /70 (BP Location: Left arm, Patient Position: Sitting, BP Method: Medium (Manual))   Pulse 89   Resp 16   Ht 5' 1.5" (1.562 m)   Wt 65.4 kg (144 lb 2.9 oz)   SpO2 98%   BMI 26.80 kg/m²     Objective:   Physical Exam   Constitutional: She is oriented to person, place, and time. She appears well-developed and well-nourished. No distress.   HENT:   Head: Normocephalic and atraumatic.   Nose: Nose normal.   Mouth/Throat: Oropharynx is clear and moist.   Eyes: Conjunctivae and EOM are normal. No scleral icterus.   Neck: Normal range of motion. Neck supple. No JVD present. No thyromegaly present.   Cardiovascular: Normal rate, regular rhythm, S1 normal and S2 normal. Exam reveals no gallop, no S3, no S4 and no friction rub.   No murmur heard.  Pulmonary/Chest: Effort normal and breath sounds normal. No stridor. No respiratory distress. She has no wheezes. She has no rales. She exhibits no tenderness.   Abdominal: Soft. Bowel sounds are normal. She exhibits no distension and no mass. There is no abdominal tenderness. There is no rebound.   Genitourinary:    Genitourinary Comments: Deferred     Musculoskeletal: Normal range of motion.         General: No tenderness, deformity or edema.   Lymphadenopathy:     She has no cervical adenopathy.   Neurological: She is alert and oriented to person, place, and time. She exhibits normal muscle tone. Coordination normal.   Skin: Skin is warm and dry. No rash noted. She is not diaphoretic. No erythema. No pallor.   Psychiatric: She has a " normal mood and affect. Her behavior is normal. Judgment and thought content normal.   Nursing note and vitals reviewed.      Lab Results   Component Value Date     12/06/2020    K 3.6 12/06/2020     12/06/2020    CO2 21 (L) 12/06/2020    BUN 9 12/06/2020    CREATININE 0.8 12/06/2020    GLU 82 12/06/2020    AST 15 12/06/2020    ALT 10 12/06/2020    ALBUMIN 3.9 12/06/2020    PROT 7.8 12/06/2020    BILITOT 0.4 12/06/2020    WBC 11.17 12/06/2020    HGB 13.0 12/06/2020    HCT 39.4 12/06/2020    HCT 39 11/18/2020    MCV 83 12/06/2020     12/06/2020    BNP <10 07/11/2020     Assessment:      1. Chest pain, unspecified type    2. Shortness of breath    3. Gastroesophageal reflux disease, unspecified whether esophagitis present    4. Anxiety        Plan:   1. Chest pain, SOB, with palpitations  - order stress ECHO  - order Holter   - order CRP, Sed rate   - start Motrin and Colchicine - pericarditis tx    2. GERD, h/o esoph stricture (2011 sp 2 dilations)  - f/u with PCP/GI  - cont PPI    3. Anxiety/migraines  - cont tx per pCP    If severe CP/SOB rec ER eval.     A total of 45 minutes was spent in face to face time, of which 50 % was spent in counseling patient on disease process, complications, treatment, and side effects of medications.     The patient was explained the above plan and given opportunity to ask questions.  She understands, chooses and consents to this plan and accepts all the risks, which include but are not limited to the risks mentioned above.   She understands the alternative of having no testing, interventions or treatments at this time. She left content and without further questions.       Thank you for allowing me to participate in this patient's care. Please do not hesitate to contact me with any questions or concerns. Consult note has been forwarded to the referral physician.

## 2020-12-22 NOTE — LETTER
December 22, 2020      Jolene Sahu PA-C  [address] [place] [address] [address] [id number]           [facility]  [address] [place] [address] [address] [place] [address]  Phone: [phone]  Fax: [phone]          Patient: Renee Ruelas   MR Number: [id number]   YOB: 1980   Date of Visit: 12/22/2020       Dear Jolene Sahu:    Thank you for referring Renee Ruelas to me for evaluation. Attached you will find relevant portions of my assessment and plan of care.    If you have questions, please do not hesitate to call me. I look forward to following Renee Ruelas along with you.    Sincerely,    Silvino Polanco MD    Enclosure  CC:  No Recipients    If you would like to receive this communication electronically, please contact [email or web address] or [phone] to request more information on [employer] [facility] [employer] access.    For providers and/or their staff who would like to refer a patient to [facility], please contact us through our one-stop-shop provider referral line, [facility], at [phone].    If you feel you have received this communication in error or would no longer like to receive these types of communications, please e-mail [email or web address]

## 2020-12-23 ENCOUNTER — TELEPHONE (OUTPATIENT)
Dept: GASTROENTEROLOGY | Facility: CLINIC | Age: 40
End: 2020-12-23

## 2020-12-28 ENCOUNTER — PATIENT MESSAGE (OUTPATIENT)
Dept: PSYCHIATRY | Facility: CLINIC | Age: 40
End: 2020-12-28
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
## 2020-12-29 ENCOUNTER — OFFICE VISIT (OUTPATIENT)
Dept: PSYCHIATRY | Facility: CLINIC | Age: 40
End: 2020-12-29
Payer: COMMERCIAL

## 2020-12-29 ENCOUNTER — DOCUMENTATION ONLY (OUTPATIENT)
Dept: ADMINISTRATIVE | Facility: HOSPITAL | Age: 40
End: 2020-12-29

## 2020-12-29 VITALS
BODY MASS INDEX: 26.92 KG/M2 | HEART RATE: 89 BPM | WEIGHT: 144.81 LBS | SYSTOLIC BLOOD PRESSURE: 122 MMHG | DIASTOLIC BLOOD PRESSURE: 68 MMHG

## 2020-12-29 DIAGNOSIS — F41.0 GENERALIZED ANXIETY DISORDER WITH PANIC ATTACKS: Primary | ICD-10-CM

## 2020-12-29 DIAGNOSIS — F41.1 GENERALIZED ANXIETY DISORDER WITH PANIC ATTACKS: Primary | ICD-10-CM

## 2020-12-29 PROBLEM — J30.9 ALLERGIC RHINITIS, UNSPECIFIED: Status: ACTIVE | Noted: 2020-05-30

## 2020-12-29 PROBLEM — U07.1 COVID-19: Status: ACTIVE | Noted: 2020-12-08

## 2020-12-29 PROBLEM — I10 HYPERTENSION: Status: ACTIVE | Noted: 2020-12-29

## 2020-12-29 PROCEDURE — 99999 PR PBB SHADOW E&M-EST. PATIENT-LVL III: ICD-10-PCS | Mod: PBBFAC,,, | Performed by: PSYCHIATRY & NEUROLOGY

## 2020-12-29 PROCEDURE — 99999 PR PBB SHADOW E&M-EST. PATIENT-LVL III: CPT | Mod: PBBFAC,,, | Performed by: PSYCHIATRY & NEUROLOGY

## 2020-12-29 PROCEDURE — 3008F BODY MASS INDEX DOCD: CPT | Mod: CPTII,S$GLB,, | Performed by: PSYCHIATRY & NEUROLOGY

## 2020-12-29 PROCEDURE — 3008F PR BODY MASS INDEX (BMI) DOCUMENTED: ICD-10-PCS | Mod: CPTII,S$GLB,, | Performed by: PSYCHIATRY & NEUROLOGY

## 2020-12-29 PROCEDURE — 90792 PR PSYCHIATRIC DIAGNOSTIC EVALUATION W/MEDICAL SERVICES: ICD-10-PCS | Mod: S$GLB,,, | Performed by: PSYCHIATRY & NEUROLOGY

## 2020-12-29 PROCEDURE — 90792 PSYCH DIAG EVAL W/MED SRVCS: CPT | Mod: S$GLB,,, | Performed by: PSYCHIATRY & NEUROLOGY

## 2020-12-29 RX ORDER — ESCITALOPRAM OXALATE 10 MG/1
10 TABLET ORAL DAILY
Qty: 30 TABLET | Refills: 1 | Status: SHIPPED | OUTPATIENT
Start: 2020-12-29 | End: 2021-04-23

## 2020-12-29 RX ORDER — ESCITALOPRAM OXALATE 10 MG/1
10 TABLET ORAL DAILY
Qty: 30 TABLET | Refills: 11 | Status: SHIPPED | OUTPATIENT
Start: 2020-12-29 | End: 2020-12-29

## 2020-12-29 NOTE — PATIENT INSTRUCTIONS
SAFETY: plan discussed with patient/guardian. Abstain from drug/etoh/tobacco use. Patient to have no access to guns/ weapons. If any suicidal or homicidal ideation or plan, or new or worsening symptoms, call 911/go to ED. Risks, benefits , and alternatives to treatment discussed with patient and guardian who demonstrated understanding and agreement and chose to treat. Guardian will call if any questions or concerns. Continue regular follow up with PCP for all non-psychiatric medical conditions.
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
## 2020-12-30 ENCOUNTER — OFFICE VISIT (OUTPATIENT)
Dept: PSYCHIATRY | Facility: CLINIC | Age: 40
End: 2020-12-30
Payer: COMMERCIAL

## 2020-12-30 DIAGNOSIS — F41.1 GENERALIZED ANXIETY DISORDER WITH PANIC ATTACKS: Primary | ICD-10-CM

## 2020-12-30 DIAGNOSIS — F41.0 GENERALIZED ANXIETY DISORDER WITH PANIC ATTACKS: Primary | ICD-10-CM

## 2020-12-30 PROCEDURE — 90837 PSYTX W PT 60 MINUTES: CPT | Mod: S$PBB,,, | Performed by: SOCIAL WORKER

## 2020-12-30 PROCEDURE — 90837 PSYTX W PT 60 MINUTES: CPT | Mod: PBBFAC | Performed by: SOCIAL WORKER

## 2020-12-30 PROCEDURE — 90837 PR PSYCHOTHERAPY W/PATIENT, 60 MIN: ICD-10-PCS | Mod: S$PBB,,, | Performed by: SOCIAL WORKER

## 2021-01-06 ENCOUNTER — OFFICE VISIT (OUTPATIENT)
Dept: PSYCHIATRY | Facility: CLINIC | Age: 41
End: 2021-01-06
Payer: COMMERCIAL

## 2021-01-06 DIAGNOSIS — F41.0 GENERALIZED ANXIETY DISORDER WITH PANIC ATTACKS: Primary | ICD-10-CM

## 2021-01-06 DIAGNOSIS — F41.1 GENERALIZED ANXIETY DISORDER WITH PANIC ATTACKS: Primary | ICD-10-CM

## 2021-01-06 PROCEDURE — 90837 PSYTX W PT 60 MINUTES: CPT | Mod: S$GLB,,, | Performed by: SOCIAL WORKER

## 2021-01-06 PROCEDURE — 90837 PR PSYCHOTHERAPY W/PATIENT, 60 MIN: ICD-10-PCS | Mod: S$GLB,,, | Performed by: SOCIAL WORKER

## 2021-01-12 ENCOUNTER — TELEPHONE (OUTPATIENT)
Dept: CARDIOLOGY | Facility: HOSPITAL | Age: 41
End: 2021-01-12

## 2021-01-18 ENCOUNTER — HOSPITAL ENCOUNTER (EMERGENCY)
Facility: HOSPITAL | Age: 41
Discharge: HOME | End: 2021-01-18
Attending: EMERGENCY MEDICINE
Payer: COMMERCIAL

## 2021-01-18 VITALS
RESPIRATION RATE: 20 BRPM | DIASTOLIC BLOOD PRESSURE: 93 MMHG | TEMPERATURE: 98 F | BODY MASS INDEX: 26.41 KG/M2 | HEART RATE: 91 BPM | HEIGHT: 62 IN | OXYGEN SATURATION: 97 % | WEIGHT: 143.5 LBS | SYSTOLIC BLOOD PRESSURE: 141 MMHG

## 2021-01-18 DIAGNOSIS — R51.9 NONINTRACTABLE HEADACHE, UNSPECIFIED CHRONICITY PATTERN, UNSPECIFIED HEADACHE TYPE: Primary | ICD-10-CM

## 2021-01-18 PROCEDURE — 99283 EMERGENCY DEPT VISIT LOW MDM: CPT

## 2021-01-18 RX ORDER — AMLODIPINE BESYLATE 5 MG/1
5 TABLET ORAL DAILY
Qty: 30 TABLET | Refills: 0 | Status: SHIPPED | OUTPATIENT
Start: 2021-01-18 | End: 2021-04-23

## 2021-01-27 DIAGNOSIS — R06.02 SHORTNESS OF BREATH: ICD-10-CM

## 2021-01-28 RX ORDER — ALBUTEROL SULFATE 90 UG/1
2 AEROSOL, METERED RESPIRATORY (INHALATION) EVERY 6 HOURS PRN
Qty: 8 G | Refills: 0 | Status: SHIPPED | OUTPATIENT
Start: 2021-01-28

## 2021-04-23 ENCOUNTER — OFFICE VISIT (OUTPATIENT)
Dept: PRIMARY CARE CLINIC | Facility: CLINIC | Age: 41
End: 2021-04-23
Payer: COMMERCIAL

## 2021-04-23 VITALS
HEIGHT: 62 IN | TEMPERATURE: 98 F | DIASTOLIC BLOOD PRESSURE: 86 MMHG | RESPIRATION RATE: 18 BRPM | OXYGEN SATURATION: 95 % | BODY MASS INDEX: 30.44 KG/M2 | WEIGHT: 165.38 LBS | HEART RATE: 88 BPM | SYSTOLIC BLOOD PRESSURE: 118 MMHG

## 2021-04-23 DIAGNOSIS — Z12.31 BREAST CANCER SCREENING BY MAMMOGRAM: ICD-10-CM

## 2021-04-23 DIAGNOSIS — F32.2 CURRENT SEVERE EPISODE OF MAJOR DEPRESSIVE DISORDER WITHOUT PSYCHOTIC FEATURES, UNSPECIFIED WHETHER RECURRENT: ICD-10-CM

## 2021-04-23 DIAGNOSIS — F41.1 GENERALIZED ANXIETY DISORDER: ICD-10-CM

## 2021-04-23 DIAGNOSIS — R06.02 SHORTNESS OF BREATH: ICD-10-CM

## 2021-04-23 DIAGNOSIS — Z76.89 ENCOUNTER TO ESTABLISH CARE: Primary | ICD-10-CM

## 2021-04-23 DIAGNOSIS — F41.0 PANIC ATTACKS: ICD-10-CM

## 2021-04-23 PROCEDURE — 99213 PR OFFICE/OUTPT VISIT, EST, LEVL III, 20-29 MIN: ICD-10-PCS | Mod: S$GLB,,, | Performed by: STUDENT IN AN ORGANIZED HEALTH CARE EDUCATION/TRAINING PROGRAM

## 2021-04-23 PROCEDURE — 99999 PR PBB SHADOW E&M-EST. PATIENT-LVL IV: CPT | Mod: PBBFAC,,, | Performed by: STUDENT IN AN ORGANIZED HEALTH CARE EDUCATION/TRAINING PROGRAM

## 2021-04-23 PROCEDURE — 3008F BODY MASS INDEX DOCD: CPT | Mod: CPTII,S$GLB,, | Performed by: STUDENT IN AN ORGANIZED HEALTH CARE EDUCATION/TRAINING PROGRAM

## 2021-04-23 PROCEDURE — 1125F AMNT PAIN NOTED PAIN PRSNT: CPT | Mod: S$GLB,,, | Performed by: STUDENT IN AN ORGANIZED HEALTH CARE EDUCATION/TRAINING PROGRAM

## 2021-04-23 PROCEDURE — 99999 PR PBB SHADOW E&M-EST. PATIENT-LVL IV: ICD-10-PCS | Mod: PBBFAC,,, | Performed by: STUDENT IN AN ORGANIZED HEALTH CARE EDUCATION/TRAINING PROGRAM

## 2021-04-23 PROCEDURE — 99213 OFFICE O/P EST LOW 20 MIN: CPT | Mod: S$GLB,,, | Performed by: STUDENT IN AN ORGANIZED HEALTH CARE EDUCATION/TRAINING PROGRAM

## 2021-04-23 PROCEDURE — 1125F PR PAIN SEVERITY QUANTIFIED, PAIN PRESENT: ICD-10-PCS | Mod: S$GLB,,, | Performed by: STUDENT IN AN ORGANIZED HEALTH CARE EDUCATION/TRAINING PROGRAM

## 2021-04-23 PROCEDURE — 3008F PR BODY MASS INDEX (BMI) DOCUMENTED: ICD-10-PCS | Mod: CPTII,S$GLB,, | Performed by: STUDENT IN AN ORGANIZED HEALTH CARE EDUCATION/TRAINING PROGRAM

## 2021-04-23 RX ORDER — ALPRAZOLAM 0.5 MG/1
TABLET ORAL
Qty: 30 TABLET | Refills: 0 | Status: SHIPPED | OUTPATIENT
Start: 2021-04-23 | End: 2021-06-10 | Stop reason: SDUPTHER

## 2021-04-28 ENCOUNTER — PATIENT MESSAGE (OUTPATIENT)
Dept: RESEARCH | Facility: HOSPITAL | Age: 41
End: 2021-04-28

## 2021-06-10 ENCOUNTER — OFFICE VISIT (OUTPATIENT)
Dept: PRIMARY CARE CLINIC | Facility: CLINIC | Age: 41
End: 2021-06-10
Payer: COMMERCIAL

## 2021-06-10 ENCOUNTER — NURSE TRIAGE (OUTPATIENT)
Dept: ADMINISTRATIVE | Facility: CLINIC | Age: 41
End: 2021-06-10

## 2021-06-10 VITALS
RESPIRATION RATE: 18 BRPM | OXYGEN SATURATION: 95 % | BODY MASS INDEX: 30.02 KG/M2 | HEIGHT: 62 IN | WEIGHT: 163.13 LBS | TEMPERATURE: 98 F | SYSTOLIC BLOOD PRESSURE: 130 MMHG | HEART RATE: 91 BPM | DIASTOLIC BLOOD PRESSURE: 86 MMHG

## 2021-06-10 DIAGNOSIS — R06.02 SHORTNESS OF BREATH: ICD-10-CM

## 2021-06-10 DIAGNOSIS — F41.0 PANIC ATTACKS: Primary | ICD-10-CM

## 2021-06-10 DIAGNOSIS — F41.1 GENERALIZED ANXIETY DISORDER: ICD-10-CM

## 2021-06-10 PROCEDURE — 99213 OFFICE O/P EST LOW 20 MIN: CPT | Mod: S$GLB,,, | Performed by: INTERNAL MEDICINE

## 2021-06-10 PROCEDURE — 99213 PR OFFICE/OUTPT VISIT, EST, LEVL III, 20-29 MIN: ICD-10-PCS | Mod: S$GLB,,, | Performed by: INTERNAL MEDICINE

## 2021-06-10 PROCEDURE — 3008F PR BODY MASS INDEX (BMI) DOCUMENTED: ICD-10-PCS | Mod: CPTII,S$GLB,, | Performed by: INTERNAL MEDICINE

## 2021-06-10 PROCEDURE — 3008F BODY MASS INDEX DOCD: CPT | Mod: CPTII,S$GLB,, | Performed by: INTERNAL MEDICINE

## 2021-06-10 PROCEDURE — 1126F PR PAIN SEVERITY QUANTIFIED, NO PAIN PRESENT: ICD-10-PCS | Mod: S$GLB,,, | Performed by: INTERNAL MEDICINE

## 2021-06-10 PROCEDURE — 1126F AMNT PAIN NOTED NONE PRSNT: CPT | Mod: S$GLB,,, | Performed by: INTERNAL MEDICINE

## 2021-06-10 PROCEDURE — 99999 PR PBB SHADOW E&M-EST. PATIENT-LVL IV: ICD-10-PCS | Mod: PBBFAC,,, | Performed by: INTERNAL MEDICINE

## 2021-06-10 PROCEDURE — 99999 PR PBB SHADOW E&M-EST. PATIENT-LVL IV: CPT | Mod: PBBFAC,,, | Performed by: INTERNAL MEDICINE

## 2021-06-10 RX ORDER — ALPRAZOLAM 0.5 MG/1
TABLET ORAL
Qty: 30 TABLET | Refills: 0 | Status: SHIPPED | OUTPATIENT
Start: 2021-06-10

## 2021-06-25 ENCOUNTER — PATIENT OUTREACH (OUTPATIENT)
Dept: ADMINISTRATIVE | Facility: HOSPITAL | Age: 41
End: 2021-06-25

## 2021-07-06 ENCOUNTER — PATIENT MESSAGE (OUTPATIENT)
Dept: ADMINISTRATIVE | Facility: HOSPITAL | Age: 41
End: 2021-07-06

## 2021-07-17 ENCOUNTER — HOSPITAL ENCOUNTER (EMERGENCY)
Facility: HOSPITAL | Age: 41
Discharge: HOME | End: 2021-07-17
Attending: EMERGENCY MEDICINE
Payer: COMMERCIAL

## 2021-07-17 VITALS
TEMPERATURE: 98 F | DIASTOLIC BLOOD PRESSURE: 76 MMHG | SYSTOLIC BLOOD PRESSURE: 151 MMHG | RESPIRATION RATE: 14 BRPM | WEIGHT: 163 LBS | HEIGHT: 61 IN | BODY MASS INDEX: 30.78 KG/M2 | OXYGEN SATURATION: 99 % | HEART RATE: 85 BPM

## 2021-07-17 DIAGNOSIS — Z20.822 LAB TEST NEGATIVE FOR COVID-19 VIRUS: Primary | ICD-10-CM

## 2021-07-17 LAB
CTP QC/QA: YES
SARS-COV-2 RDRP RESP QL NAA+PROBE: NEGATIVE

## 2021-07-17 PROCEDURE — U0002 COVID-19 LAB TEST NON-CDC: HCPCS | Performed by: EMERGENCY MEDICINE

## 2021-07-17 PROCEDURE — 99282 EMERGENCY DEPT VISIT SF MDM: CPT | Mod: 25

## 2021-07-17 PROCEDURE — 99282 PR EMERGENCY DEPT VISIT,LEVEL II: ICD-10-PCS | Mod: CS,,, | Performed by: EMERGENCY MEDICINE

## 2021-07-17 PROCEDURE — 99282 EMERGENCY DEPT VISIT SF MDM: CPT | Mod: CS,,, | Performed by: EMERGENCY MEDICINE

## 2021-07-23 ENCOUNTER — HOSPITAL ENCOUNTER (EMERGENCY)
Facility: HOSPITAL | Age: 41
Discharge: HOME | End: 2021-07-23
Attending: EMERGENCY MEDICINE
Payer: COMMERCIAL

## 2021-07-23 VITALS
OXYGEN SATURATION: 98 % | RESPIRATION RATE: 18 BRPM | DIASTOLIC BLOOD PRESSURE: 82 MMHG | BODY MASS INDEX: 30.8 KG/M2 | WEIGHT: 163 LBS | HEART RATE: 85 BPM | SYSTOLIC BLOOD PRESSURE: 128 MMHG | TEMPERATURE: 99 F

## 2021-07-23 DIAGNOSIS — M54.31 SCIATICA OF RIGHT SIDE: Primary | ICD-10-CM

## 2021-07-23 PROCEDURE — 25000003 PHARM REV CODE 250: Performed by: EMERGENCY MEDICINE

## 2021-07-23 PROCEDURE — 99283 EMERGENCY DEPT VISIT LOW MDM: CPT

## 2021-07-23 PROCEDURE — 99284 EMERGENCY DEPT VISIT MOD MDM: CPT | Mod: ,,, | Performed by: EMERGENCY MEDICINE

## 2021-07-23 PROCEDURE — 99284 PR EMERGENCY DEPT VISIT,LEVEL IV: ICD-10-PCS | Mod: ,,, | Performed by: EMERGENCY MEDICINE

## 2021-07-23 RX ORDER — IBUPROFEN 400 MG/1
400 TABLET ORAL
Status: COMPLETED | OUTPATIENT
Start: 2021-07-23 | End: 2021-07-23

## 2021-07-23 RX ORDER — ACETAMINOPHEN 500 MG
1000 TABLET ORAL
Status: COMPLETED | OUTPATIENT
Start: 2021-07-23 | End: 2021-07-23

## 2021-07-23 RX ADMIN — IBUPROFEN 400 MG: 400 TABLET ORAL at 05:07

## 2021-07-23 RX ADMIN — ACETAMINOPHEN 1000 MG: 500 TABLET ORAL at 05:07

## 2021-09-20 ENCOUNTER — NURSE TRIAGE (OUTPATIENT)
Dept: ADMINISTRATIVE | Facility: CLINIC | Age: 41
End: 2021-09-20

## 2021-09-20 ENCOUNTER — HOSPITAL ENCOUNTER (EMERGENCY)
Facility: HOSPITAL | Age: 41
Discharge: HOME | End: 2021-09-20
Attending: EMERGENCY MEDICINE
Payer: COMMERCIAL

## 2021-09-20 VITALS
WEIGHT: 165.88 LBS | TEMPERATURE: 99 F | SYSTOLIC BLOOD PRESSURE: 112 MMHG | RESPIRATION RATE: 22 BRPM | HEART RATE: 84 BPM | BODY MASS INDEX: 31.35 KG/M2 | OXYGEN SATURATION: 100 % | DIASTOLIC BLOOD PRESSURE: 82 MMHG

## 2021-09-20 DIAGNOSIS — R07.9 CHEST PAIN: ICD-10-CM

## 2021-09-20 LAB
ALBUMIN SERPL BCP-MCNC: 3.9 G/DL (ref 3.5–5.2)
ALP SERPL-CCNC: 44 U/L (ref 55–135)
ALT SERPL W/O P-5'-P-CCNC: 11 U/L (ref 10–44)
ANION GAP SERPL CALC-SCNC: 12 MMOL/L (ref 8–16)
AST SERPL-CCNC: 14 U/L (ref 10–40)
BASOPHILS # BLD AUTO: 0.07 K/UL (ref 0–0.2)
BASOPHILS NFR BLD: 0.8 % (ref 0–1.9)
BILIRUB SERPL-MCNC: 0.4 MG/DL (ref 0.1–1)
BUN SERPL-MCNC: 9 MG/DL (ref 6–20)
CALCIUM SERPL-MCNC: 9.6 MG/DL (ref 8.7–10.5)
CHLORIDE SERPL-SCNC: 106 MMOL/L (ref 95–110)
CK SERPL-CCNC: 86 U/L (ref 20–180)
CO2 SERPL-SCNC: 23 MMOL/L (ref 23–29)
CREAT SERPL-MCNC: 0.9 MG/DL (ref 0.5–1.4)
DIFFERENTIAL METHOD: ABNORMAL
EOSINOPHIL # BLD AUTO: 0.1 K/UL (ref 0–0.5)
EOSINOPHIL NFR BLD: 1.2 % (ref 0–8)
ERYTHROCYTE [DISTWIDTH] IN BLOOD BY AUTOMATED COUNT: 13.1 % (ref 11.5–14.5)
EST. GFR  (AFRICAN AMERICAN): >60 ML/MIN/1.73 M^2
EST. GFR  (NON AFRICAN AMERICAN): >60 ML/MIN/1.73 M^2
GLUCOSE SERPL-MCNC: 79 MG/DL (ref 70–110)
HCT VFR BLD AUTO: 42.9 % (ref 37–48.5)
HGB BLD-MCNC: 14.1 G/DL (ref 12–16)
IMM GRANULOCYTES # BLD AUTO: 0.05 K/UL (ref 0–0.04)
IMM GRANULOCYTES NFR BLD AUTO: 0.5 % (ref 0–0.5)
LYMPHOCYTES # BLD AUTO: 2.6 K/UL (ref 1–4.8)
LYMPHOCYTES NFR BLD: 27.7 % (ref 18–48)
MCH RBC QN AUTO: 27.5 PG (ref 27–31)
MCHC RBC AUTO-ENTMCNC: 32.9 G/DL (ref 32–36)
MCV RBC AUTO: 84 FL (ref 82–98)
MONOCYTES # BLD AUTO: 0.8 K/UL (ref 0.3–1)
MONOCYTES NFR BLD: 8.7 % (ref 4–15)
NEUTROPHILS # BLD AUTO: 5.7 K/UL (ref 1.8–7.7)
NEUTROPHILS NFR BLD: 61.1 % (ref 38–73)
NRBC BLD-RTO: 0 /100 WBC
PLATELET # BLD AUTO: 273 K/UL (ref 150–450)
PMV BLD AUTO: 10.9 FL (ref 9.2–12.9)
POTASSIUM SERPL-SCNC: 4 MMOL/L (ref 3.5–5.1)
PROT SERPL-MCNC: 8.3 G/DL (ref 6–8.4)
RBC # BLD AUTO: 5.13 M/UL (ref 4–5.4)
SODIUM SERPL-SCNC: 141 MMOL/L (ref 136–145)
TROPONIN I SERPL DL<=0.01 NG/ML-MCNC: <0.006 NG/ML (ref 0–0.03)
WBC # BLD AUTO: 9.24 K/UL (ref 3.9–12.7)

## 2021-09-20 PROCEDURE — 85025 COMPLETE CBC W/AUTO DIFF WBC: CPT | Performed by: NURSE PRACTITIONER

## 2021-09-20 PROCEDURE — 93010 ELECTROCARDIOGRAM REPORT: CPT | Mod: ,,, | Performed by: INTERNAL MEDICINE

## 2021-09-20 PROCEDURE — 93005 ELECTROCARDIOGRAM TRACING: CPT

## 2021-09-20 PROCEDURE — 82550 ASSAY OF CK (CPK): CPT | Performed by: EMERGENCY MEDICINE

## 2021-09-20 PROCEDURE — 93010 EKG 12-LEAD: ICD-10-PCS | Mod: ,,, | Performed by: INTERNAL MEDICINE

## 2021-09-20 PROCEDURE — 99285 EMERGENCY DEPT VISIT HI MDM: CPT | Mod: 25

## 2021-09-20 PROCEDURE — 80053 COMPREHEN METABOLIC PANEL: CPT | Performed by: EMERGENCY MEDICINE

## 2021-09-20 PROCEDURE — 84484 ASSAY OF TROPONIN QUANT: CPT | Performed by: EMERGENCY MEDICINE

## 2021-09-20 RX ORDER — ATORVASTATIN CALCIUM 10 MG/1
10 TABLET, FILM COATED ORAL DAILY
COMMUNITY

## 2021-10-05 ENCOUNTER — PATIENT MESSAGE (OUTPATIENT)
Dept: ADMINISTRATIVE | Facility: HOSPITAL | Age: 41
End: 2021-10-05

## 2021-10-06 ENCOUNTER — TELEPHONE (OUTPATIENT)
Dept: CARDIOLOGY | Facility: CLINIC | Age: 41
End: 2021-10-06

## 2021-10-07 ENCOUNTER — TELEPHONE (OUTPATIENT)
Dept: CARDIOLOGY | Facility: CLINIC | Age: 41
End: 2021-10-07

## 2021-10-11 ENCOUNTER — TELEPHONE (OUTPATIENT)
Dept: CARDIOLOGY | Facility: CLINIC | Age: 41
End: 2021-10-11

## 2021-10-12 ENCOUNTER — PATIENT OUTREACH (OUTPATIENT)
Dept: ADMINISTRATIVE | Facility: OTHER | Age: 41
End: 2021-10-12

## 2021-10-13 ENCOUNTER — OFFICE VISIT (OUTPATIENT)
Dept: CARDIOLOGY | Facility: CLINIC | Age: 41
End: 2021-10-13
Payer: COMMERCIAL

## 2021-10-13 VITALS
WEIGHT: 168 LBS | SYSTOLIC BLOOD PRESSURE: 128 MMHG | HEIGHT: 61 IN | RESPIRATION RATE: 16 BRPM | HEART RATE: 90 BPM | BODY MASS INDEX: 31.72 KG/M2 | OXYGEN SATURATION: 97 % | DIASTOLIC BLOOD PRESSURE: 74 MMHG

## 2021-10-13 DIAGNOSIS — R06.02 SHORTNESS OF BREATH: Primary | ICD-10-CM

## 2021-10-13 DIAGNOSIS — K21.9 GASTROESOPHAGEAL REFLUX DISEASE, UNSPECIFIED WHETHER ESOPHAGITIS PRESENT: ICD-10-CM

## 2021-10-13 DIAGNOSIS — R00.2 PALPITATIONS: ICD-10-CM

## 2021-10-13 DIAGNOSIS — G47.30 SLEEP APNEA, UNSPECIFIED TYPE: ICD-10-CM

## 2021-10-13 DIAGNOSIS — R07.9 CHEST PAIN, UNSPECIFIED TYPE: ICD-10-CM

## 2021-10-13 DIAGNOSIS — F41.9 ANXIETY: ICD-10-CM

## 2021-10-13 DIAGNOSIS — E78.49 OTHER HYPERLIPIDEMIA: ICD-10-CM

## 2021-10-13 PROCEDURE — 3078F DIAST BP <80 MM HG: CPT | Mod: CPTII,S$GLB,, | Performed by: INTERNAL MEDICINE

## 2021-10-13 PROCEDURE — 3008F PR BODY MASS INDEX (BMI) DOCUMENTED: ICD-10-PCS | Mod: CPTII,S$GLB,, | Performed by: INTERNAL MEDICINE

## 2021-10-13 PROCEDURE — 99214 OFFICE O/P EST MOD 30 MIN: CPT | Mod: S$GLB,,, | Performed by: INTERNAL MEDICINE

## 2021-10-13 PROCEDURE — 99999 PR PBB SHADOW E&M-EST. PATIENT-LVL IV: CPT | Mod: PBBFAC,,, | Performed by: INTERNAL MEDICINE

## 2021-10-13 PROCEDURE — 1159F PR MEDICATION LIST DOCUMENTED IN MEDICAL RECORD: ICD-10-PCS | Mod: CPTII,S$GLB,, | Performed by: INTERNAL MEDICINE

## 2021-10-13 PROCEDURE — 3074F SYST BP LT 130 MM HG: CPT | Mod: CPTII,S$GLB,, | Performed by: INTERNAL MEDICINE

## 2021-10-13 PROCEDURE — 1159F MED LIST DOCD IN RCRD: CPT | Mod: CPTII,S$GLB,, | Performed by: INTERNAL MEDICINE

## 2021-10-13 PROCEDURE — 99999 PR PBB SHADOW E&M-EST. PATIENT-LVL IV: ICD-10-PCS | Mod: PBBFAC,,, | Performed by: INTERNAL MEDICINE

## 2021-10-13 PROCEDURE — 3078F PR MOST RECENT DIASTOLIC BLOOD PRESSURE < 80 MM HG: ICD-10-PCS | Mod: CPTII,S$GLB,, | Performed by: INTERNAL MEDICINE

## 2021-10-13 PROCEDURE — 3008F BODY MASS INDEX DOCD: CPT | Mod: CPTII,S$GLB,, | Performed by: INTERNAL MEDICINE

## 2021-10-13 PROCEDURE — 1160F PR REVIEW ALL MEDS BY PRESCRIBER/CLIN PHARMACIST DOCUMENTED: ICD-10-PCS | Mod: CPTII,S$GLB,, | Performed by: INTERNAL MEDICINE

## 2021-10-13 PROCEDURE — 1160F RVW MEDS BY RX/DR IN RCRD: CPT | Mod: CPTII,S$GLB,, | Performed by: INTERNAL MEDICINE

## 2021-10-13 PROCEDURE — 99214 PR OFFICE/OUTPT VISIT, EST, LEVL IV, 30-39 MIN: ICD-10-PCS | Mod: S$GLB,,, | Performed by: INTERNAL MEDICINE

## 2021-10-13 PROCEDURE — 3074F PR MOST RECENT SYSTOLIC BLOOD PRESSURE < 130 MM HG: ICD-10-PCS | Mod: CPTII,S$GLB,, | Performed by: INTERNAL MEDICINE

## 2021-10-21 ENCOUNTER — TELEPHONE (OUTPATIENT)
Dept: CARDIOLOGY | Facility: CLINIC | Age: 41
End: 2021-10-21

## 2021-10-21 ENCOUNTER — HOSPITAL ENCOUNTER (OUTPATIENT)
Dept: CARDIOLOGY | Facility: HOSPITAL | Age: 41
Discharge: HOME | End: 2021-10-21
Attending: INTERNAL MEDICINE
Payer: COMMERCIAL

## 2021-10-21 VITALS
BODY MASS INDEX: 31.53 KG/M2 | HEIGHT: 61 IN | SYSTOLIC BLOOD PRESSURE: 128 MMHG | DIASTOLIC BLOOD PRESSURE: 97 MMHG | BODY MASS INDEX: 32.66 KG/M2 | DIASTOLIC BLOOD PRESSURE: 74 MMHG | HEIGHT: 61 IN | SYSTOLIC BLOOD PRESSURE: 136 MMHG | WEIGHT: 167 LBS | WEIGHT: 173 LBS

## 2021-10-21 DIAGNOSIS — R00.2 PALPITATIONS: ICD-10-CM

## 2021-10-21 DIAGNOSIS — F41.9 ANXIETY: ICD-10-CM

## 2021-10-21 DIAGNOSIS — R07.9 CHEST PAIN, UNSPECIFIED TYPE: ICD-10-CM

## 2021-10-21 DIAGNOSIS — R06.02 SHORTNESS OF BREATH: ICD-10-CM

## 2021-10-21 DIAGNOSIS — K21.9 GASTROESOPHAGEAL REFLUX DISEASE, UNSPECIFIED WHETHER ESOPHAGITIS PRESENT: ICD-10-CM

## 2021-10-21 LAB
AORTIC ROOT ANNULUS: 2.46 CM
ASCENDING AORTA: 2.7 CM
AV INDEX (PROSTH): 0.78
AV MEAN GRADIENT: 3 MMHG
AV PEAK GRADIENT: 6 MMHG
AV VALVE AREA: 2.43 CM2
AV VELOCITY RATIO: 0.82
BSA FOR ECHO PROCEDURE: 1.81 M2
CV ECHO LV RWT: 0.39 CM
DOP CALC AO PEAK VEL: 1.19 M/S
DOP CALC AO VTI: 26.17 CM
DOP CALC LVOT AREA: 3.1 CM2
DOP CALC LVOT DIAMETER: 1.99 CM
DOP CALC LVOT PEAK VEL: 0.98 M/S
DOP CALC LVOT STROKE VOLUME: 63.6 CM3
DOP CALC RVOT PEAK VEL: 0.59 M/S
DOP CALC RVOT VTI: 13.12 CM
DOP CALCLVOT PEAK VEL VTI: 20.46 CM
E WAVE DECELERATION TIME: 171.28 MSEC
E/A RATIO: 1.23
E/E' RATIO: 7.5 M/S
ECHO LV POSTERIOR WALL: 0.87 CM (ref 0.6–1.1)
EJECTION FRACTION: 55 %
FRACTIONAL SHORTENING: 35 % (ref 28–44)
INTERVENTRICULAR SEPTUM: 0.92 CM (ref 0.6–1.1)
IVRT: 77.07 MSEC
LA MAJOR: 4.71 CM
LA MINOR: 5.31 CM
LA WIDTH: 3.49 CM
LEFT ATRIUM SIZE: 3.23 CM
LEFT ATRIUM VOLUME INDEX: 27.3 ML/M2
LEFT ATRIUM VOLUME: 47.83 CM3
LEFT INTERNAL DIMENSION IN SYSTOLE: 2.87 CM (ref 2.1–4)
LEFT VENTRICLE DIASTOLIC VOLUME INDEX: 50.29 ML/M2
LEFT VENTRICLE DIASTOLIC VOLUME: 88 ML
LEFT VENTRICLE MASS INDEX: 73 G/M2
LEFT VENTRICLE SYSTOLIC VOLUME INDEX: 17.9 ML/M2
LEFT VENTRICLE SYSTOLIC VOLUME: 31.38 ML
LEFT VENTRICULAR INTERNAL DIMENSION IN DIASTOLE: 4.41 CM (ref 3.5–6)
LEFT VENTRICULAR MASS: 127.53 G
LV LATERAL E/E' RATIO: 6.43 M/S
LV SEPTAL E/E' RATIO: 9 M/S
MV PEAK A VEL: 0.73 M/S
MV PEAK E VEL: 0.9 M/S
PULM VEIN S/D RATIO: 2.21
PV MEAN GRADIENT: 1 MMHG
PV PEAK D VEL: 0.28 M/S
PV PEAK S VEL: 0.62 M/S
PV PEAK VELOCITY: 0.89 CM/S
RA MAJOR: 4.82 CM
RA PRESSURE: 3 MMHG
RA WIDTH: 3.7 CM
RIGHT VENTRICULAR END-DIASTOLIC DIMENSION: 3.63 CM
SINUS: 2.48 CM
STJ: 2.65 CM
TDI LATERAL: 0.14 M/S
TDI SEPTAL: 0.1 M/S
TDI: 0.12 M/S
TRICUSPID ANNULAR PLANE SYSTOLIC EXCURSION: 2.3 CM

## 2021-10-21 PROCEDURE — 93306 TTE W/DOPPLER COMPLETE: CPT | Mod: 26,,, | Performed by: INTERNAL MEDICINE

## 2021-10-21 PROCEDURE — 93225 XTRNL ECG REC<48 HRS REC: CPT

## 2021-10-21 PROCEDURE — 93017 CV STRESS TEST TRACING ONLY: CPT

## 2021-10-21 PROCEDURE — 93306 ECHO (CUPID ONLY): ICD-10-PCS | Mod: 26,,, | Performed by: INTERNAL MEDICINE

## 2021-10-21 PROCEDURE — 93018 CV STRESS TEST I&R ONLY: CPT | Mod: ,,, | Performed by: INTERNAL MEDICINE

## 2021-10-21 PROCEDURE — 93016 CV STRESS TEST SUPVJ ONLY: CPT | Mod: ,,, | Performed by: INTERNAL MEDICINE

## 2021-10-21 PROCEDURE — 93227 HOLTER MONITOR - 48 HOUR (CUPID ONLY): ICD-10-PCS | Mod: ,,, | Performed by: INTERNAL MEDICINE

## 2021-10-21 PROCEDURE — 93227 XTRNL ECG REC<48 HR R&I: CPT | Mod: ,,, | Performed by: INTERNAL MEDICINE

## 2021-10-21 PROCEDURE — 93306 TTE W/DOPPLER COMPLETE: CPT

## 2021-10-21 PROCEDURE — 93018 EXERCISE STRESS - EKG (CUPID ONLY): ICD-10-PCS | Mod: ,,, | Performed by: INTERNAL MEDICINE

## 2021-10-21 PROCEDURE — 93016 EXERCISE STRESS - EKG (CUPID ONLY): ICD-10-PCS | Mod: ,,, | Performed by: INTERNAL MEDICINE

## 2021-10-22 ENCOUNTER — TELEPHONE (OUTPATIENT)
Dept: CARDIOLOGY | Facility: CLINIC | Age: 41
End: 2021-10-22

## 2021-10-22 LAB
CV STRESS BASE HR: 78 BPM
DIASTOLIC BLOOD PRESSURE: 97 MMHG
OHS CV CPX 1 MINUTE RECOVERY HEART RATE: 142 BPM
OHS CV CPX 85 PERCENT MAX PREDICTED HEART RATE MALE: 145
OHS CV CPX ESTIMATED METS: 10
OHS CV CPX MAX PREDICTED HEART RATE: 171
OHS CV CPX PATIENT IS FEMALE: 1
OHS CV CPX PATIENT IS MALE: 0
OHS CV CPX PEAK DIASTOLIC BLOOD PRESSURE: 76 MMHG
OHS CV CPX PEAK HEAR RATE: 173 BPM
OHS CV CPX PEAK RATE PRESSURE PRODUCT: NORMAL
OHS CV CPX PEAK SYSTOLIC BLOOD PRESSURE: 164 MMHG
OHS CV CPX PERCENT MAX PREDICTED HEART RATE ACHIEVED: 101
OHS CV CPX RATE PRESSURE PRODUCT PRESENTING: NORMAL
STRESS ECHO POST EXERCISE DUR MIN: 8 MINUTES
SYSTOLIC BLOOD PRESSURE: 136 MMHG

## 2021-10-26 LAB
OHS CV EVENT MONITOR DAY: 0
OHS CV HOLTER LENGTH DECIMAL HOURS: 47.98
OHS CV HOLTER LENGTH HOURS: 47
OHS CV HOLTER LENGTH MINUTES: 59
OHS CV HOLTER SINUS AVERAGE HR: 90
OHS CV HOLTER SINUS MAX HR: 146
OHS CV HOLTER SINUS MIN HR: 58

## 2021-10-27 ENCOUNTER — TELEPHONE (OUTPATIENT)
Dept: CARDIOLOGY | Facility: CLINIC | Age: 41
End: 2021-10-27
Payer: COMMERCIAL

## 2022-09-18 ENCOUNTER — HOSPITAL ENCOUNTER (EMERGENCY)
Facility: HOSPITAL | Age: 42
Discharge: HOME | End: 2022-09-18
Attending: EMERGENCY MEDICINE
Payer: COMMERCIAL

## 2022-09-18 VITALS
HEART RATE: 103 BPM | SYSTOLIC BLOOD PRESSURE: 140 MMHG | OXYGEN SATURATION: 97 % | TEMPERATURE: 98 F | DIASTOLIC BLOOD PRESSURE: 94 MMHG | WEIGHT: 186.94 LBS | RESPIRATION RATE: 18 BRPM | BODY MASS INDEX: 35.32 KG/M2

## 2022-09-18 DIAGNOSIS — S90.425A BLISTER OF TOE OF LEFT FOOT, INITIAL ENCOUNTER: Primary | ICD-10-CM

## 2022-09-18 PROCEDURE — 99283 EMERGENCY DEPT VISIT LOW MDM: CPT

## 2022-09-18 RX ORDER — MUPIROCIN 20 MG/G
OINTMENT TOPICAL 3 TIMES DAILY
Qty: 15 G | Refills: 0 | Status: SHIPPED | OUTPATIENT
Start: 2022-09-18

## 2022-09-18 NOTE — ED PROVIDER NOTES
Encounter Date: 9/18/2022       History     Chief Complaint   Patient presents with    Foot Swelling     Swelling to left foot that began yesterday but was worse this morning.  Pt denies any injury but states she had an insect bite there that she was scratching.     41-year-old female presents emergency department with pain and swelling in between her toes.  Patient reports symptoms began approximately 2 days ago.  Patient denies any fever, chills, calf pain, injury or any other symptoms at this time.  Previous treatment at home includes topical lidocaine.    The history is provided by the patient.   Review of patient's allergies indicates:   Allergen Reactions    Sulfa (sulfonamide antibiotics) Swelling     Past Medical History:   Diagnosis Date    Anxiety     Esophageal stricture     GERD (gastroesophageal reflux disease)     Hypercholesterolemia     Migraine headache      Past Surgical History:   Procedure Laterality Date    ESOPHAGEAL DILATION      ESOPHAGOGASTRODUODENOSCOPY      ESOPHAGOGASTRODUODENOSCOPY N/A 6/30/2020    Procedure: ESOPHAGOGASTRODUODENOSCOPY (EGD);  Surgeon: Kishor Cordero MD;  Location: [facility];  Service: Endoscopy;  Laterality: N/A;  NEEDS RAPID COVID 19 TEST--PT COMING FROM [place]     Family History   Problem Relation Age of Onset    Diabetes Father     Kidney disease Father     Hypertension Father     Lymphoma Mother     Colon cancer Neg Hx     Colon polyps Neg Hx     Esophageal cancer Neg Hx     Irritable bowel syndrome Neg Hx     Inflammatory bowel disease Neg Hx     Stomach cancer Neg Hx      Social History     Tobacco Use    Smoking status: Never    Smokeless tobacco: Never   Substance Use Topics    Alcohol use: No    Drug use: No     Review of Systems   Constitutional:  Negative for fever.   HENT:  Negative for sore throat.    Respiratory:  Negative for shortness of breath.    Cardiovascular:  Negative for chest pain.   Gastrointestinal:  Negative for nausea.   Genitourinary:   Negative for dysuria.   Musculoskeletal:  Negative for back pain.   Skin:  Negative for rash.        + Pain and swelling in between toes    Neurological:  Negative for weakness.   Hematological:  Does not bruise/bleed easily.   All other systems reviewed and are negative.    Physical Exam     Initial Vitals [09/18/22 1241]   BP Pulse Resp Temp SpO2   (!) 140/94 103 18 98.1 °F (36.7 °C) 97 %      MAP       --         Physical Exam    Constitutional: She appears well-developed and well-nourished. She is not diaphoretic. No distress.   Eyes: Conjunctivae are normal.   Pulmonary/Chest: No respiratory distress.   Musculoskeletal:         General: No edema. Normal range of motion.      Left foot: Swelling and tenderness present.        Feet:       Comments: Blister to web of 3-4 toe to L foot, no drainage and erythema      Neurological: She is alert and oriented to person, place, and time. No cranial nerve deficit. GCS score is 15. GCS eye subscore is 4. GCS verbal subscore is 5. GCS motor subscore is 6.   Skin: Skin is warm and dry. Capillary refill takes less than 2 seconds.   Psychiatric: She has a normal mood and affect. Thought content normal.       ED Course   Procedures  Labs Reviewed - No data to display       Imaging Results    None          Medications - No data to display          I discussed with patient and/or family/caretaker that evaluation in the ED does not suggest any emergent or life threatening medical conditions requiring immediate intervention beyond what was provided in the ED, and I believe patient is safe for discharge.  Regardless, an unremarkable evaluation in the ED does not preclude the development or presence of a serious of life threatening condition. As such, patient was instructed to return immediately for any worsening or change in current symptoms.                   Clinical Impression:   Final diagnoses:  [S90.425A] Blister of toe of left foot, initial encounter (Primary)      ED  Disposition Condition    Discharge Stable          ED Prescriptions       Medication Sig Dispense Start Date End Date Auth. Provider    mupirocin (BACTROBAN) 2 % ointment Apply topically 3 (three) times daily. 15 g 9/18/2022 -- SYEDA Almanza Jr.          Follow-up Information       Follow up With Specialties Details Why Contact Info    [facility] In 1 week  [address] [place] [address]  [phone]               SYEDA Almanza Jr.  09/18/22 2665

## 2023-03-29 ENCOUNTER — HOSPITAL ENCOUNTER (EMERGENCY)
Facility: HOSPITAL | Age: 43
Discharge: HOME | End: 2023-03-29
Attending: EMERGENCY MEDICINE
Payer: MEDICAID

## 2023-03-29 VITALS
BODY MASS INDEX: 34.55 KG/M2 | HEART RATE: 95 BPM | SYSTOLIC BLOOD PRESSURE: 131 MMHG | OXYGEN SATURATION: 97 % | TEMPERATURE: 98 F | HEIGHT: 61 IN | RESPIRATION RATE: 18 BRPM | DIASTOLIC BLOOD PRESSURE: 85 MMHG | WEIGHT: 183 LBS

## 2023-03-29 DIAGNOSIS — R07.9 CHEST PAIN: Primary | ICD-10-CM

## 2023-03-29 LAB
ALBUMIN SERPL BCP-MCNC: 3.7 G/DL (ref 3.5–5.2)
ALP SERPL-CCNC: 48 U/L (ref 55–135)
ALT SERPL W/O P-5'-P-CCNC: 22 U/L (ref 10–44)
ANION GAP SERPL CALC-SCNC: 7 MMOL/L (ref 8–16)
AST SERPL-CCNC: 18 U/L (ref 10–40)
B-HCG UR QL: NEGATIVE
BASOPHILS # BLD AUTO: 0.06 K/UL (ref 0–0.2)
BASOPHILS NFR BLD: 0.6 % (ref 0–1.9)
BILIRUB SERPL-MCNC: 0.3 MG/DL (ref 0.1–1)
BNP SERPL-MCNC: 12 PG/ML (ref 0–99)
BUN SERPL-MCNC: 11 MG/DL (ref 6–20)
CALCIUM SERPL-MCNC: 9.2 MG/DL (ref 8.7–10.5)
CHLORIDE SERPL-SCNC: 103 MMOL/L (ref 95–110)
CO2 SERPL-SCNC: 28 MMOL/L (ref 23–29)
CREAT SERPL-MCNC: 0.8 MG/DL (ref 0.5–1.4)
DIFFERENTIAL METHOD: ABNORMAL
EOSINOPHIL # BLD AUTO: 0.2 K/UL (ref 0–0.5)
EOSINOPHIL NFR BLD: 1.9 % (ref 0–8)
ERYTHROCYTE [DISTWIDTH] IN BLOOD BY AUTOMATED COUNT: 13 % (ref 11.5–14.5)
EST. GFR  (NO RACE VARIABLE): >60 ML/MIN/1.73 M^2
GLUCOSE SERPL-MCNC: 87 MG/DL (ref 70–110)
HCT VFR BLD AUTO: 39.2 % (ref 37–48.5)
HGB BLD-MCNC: 13 G/DL (ref 12–16)
IMM GRANULOCYTES # BLD AUTO: 0.05 K/UL (ref 0–0.04)
IMM GRANULOCYTES NFR BLD AUTO: 0.5 % (ref 0–0.5)
LYMPHOCYTES # BLD AUTO: 2.8 K/UL (ref 1–4.8)
LYMPHOCYTES NFR BLD: 29.4 % (ref 18–48)
MCH RBC QN AUTO: 27.1 PG (ref 27–31)
MCHC RBC AUTO-ENTMCNC: 33.2 G/DL (ref 32–36)
MCV RBC AUTO: 82 FL (ref 82–98)
MONOCYTES # BLD AUTO: 1.1 K/UL (ref 0.3–1)
MONOCYTES NFR BLD: 11.9 % (ref 4–15)
NEUTROPHILS # BLD AUTO: 5.3 K/UL (ref 1.8–7.7)
NEUTROPHILS NFR BLD: 55.7 % (ref 38–73)
NRBC BLD-RTO: 0 /100 WBC
PLATELET # BLD AUTO: 257 K/UL (ref 150–450)
PMV BLD AUTO: 10.2 FL (ref 9.2–12.9)
POTASSIUM SERPL-SCNC: 3.9 MMOL/L (ref 3.5–5.1)
PROT SERPL-MCNC: 7.8 G/DL (ref 6–8.4)
RBC # BLD AUTO: 4.8 M/UL (ref 4–5.4)
SODIUM SERPL-SCNC: 138 MMOL/L (ref 136–145)
TROPONIN I SERPL DL<=0.01 NG/ML-MCNC: <0.006 NG/ML (ref 0–0.03)
WBC # BLD AUTO: 9.59 K/UL (ref 3.9–12.7)

## 2023-03-29 PROCEDURE — 83880 ASSAY OF NATRIURETIC PEPTIDE: CPT | Performed by: PHYSICIAN ASSISTANT

## 2023-03-29 PROCEDURE — 80053 COMPREHEN METABOLIC PANEL: CPT | Performed by: PHYSICIAN ASSISTANT

## 2023-03-29 PROCEDURE — 93010 EKG 12-LEAD: ICD-10-PCS | Mod: ,,, | Performed by: INTERNAL MEDICINE

## 2023-03-29 PROCEDURE — 81025 URINE PREGNANCY TEST: CPT | Performed by: PHYSICIAN ASSISTANT

## 2023-03-29 PROCEDURE — 84484 ASSAY OF TROPONIN QUANT: CPT | Performed by: PHYSICIAN ASSISTANT

## 2023-03-29 PROCEDURE — 93005 ELECTROCARDIOGRAM TRACING: CPT

## 2023-03-29 PROCEDURE — 93010 ELECTROCARDIOGRAM REPORT: CPT | Mod: ,,, | Performed by: INTERNAL MEDICINE

## 2023-03-29 PROCEDURE — 85025 COMPLETE CBC W/AUTO DIFF WBC: CPT | Performed by: PHYSICIAN ASSISTANT

## 2023-03-29 PROCEDURE — 99285 EMERGENCY DEPT VISIT HI MDM: CPT | Mod: 25

## 2023-03-29 RX ORDER — NAPROXEN 375 MG/1
375 TABLET ORAL 2 TIMES DAILY WITH MEALS
Qty: 30 TABLET | Refills: 0 | Status: SHIPPED | OUTPATIENT
Start: 2023-03-29

## 2023-03-29 NOTE — ED PROVIDER NOTES
SCRIBE #1 NOTE: I, Ivonne Mendes, am scribing for, and in the presence of, Luiz Russell MD. I have scribed the entire note.      History      Chief Complaint   Patient presents with    Chest Pain     Right sided cp that radiates to right side of neck with slight sob onset of last night.        Review of patient's allergies indicates:   Allergen Reactions    Sulfa (sulfonamide antibiotics) Swelling        HPI   HPI    3/29/2023, 8:55 AM   History obtained from the patient      History of Present Illness: Renee Ruelas is a 42 y.o. female patient with a PMHx of anxiety, GERD, hypercholesterolemia, and migraine headache who presents to the Emergency Department for CP which onset gradually last night. Symptoms are constant and moderate in severity. No mitigating or exacerbating factors reported. Associated sxs include SOB. Patient denies any fever, chills, N/V/D, diaphoresis, weakness, and all other sxs at this time. No prior Tx reported. No further complaints or concerns at this time.         Arrival mode: Personal vehicle    PCP: [facility]       Past Medical History:  Past Medical History:   Diagnosis Date    Anxiety     Esophageal stricture     GERD (gastroesophageal reflux disease)     Hypercholesterolemia     Migraine headache        Past Surgical History:  Past Surgical History:   Procedure Laterality Date    ESOPHAGEAL DILATION      ESOPHAGOGASTRODUODENOSCOPY      ESOPHAGOGASTRODUODENOSCOPY N/A 6/30/2020    Procedure: ESOPHAGOGASTRODUODENOSCOPY (EGD);  Surgeon: Kishor Cordero MD;  Location: [facility];  Service: Endoscopy;  Laterality: N/A;  NEEDS RAPID COVID 19 TEST--PT COMING FROM [facility]         Family History:  Family History   Problem Relation Age of Onset    Diabetes Father     Kidney disease Father     Hypertension Father     Lymphoma Mother     Colon cancer Neg Hx     Colon polyps Neg Hx     Esophageal cancer Neg Hx     Irritable bowel syndrome Neg Hx     Inflammatory bowel  disease Neg Hx     Stomach cancer Neg Hx        Social History:  Social History     Tobacco Use    Smoking status: Never    Smokeless tobacco: Never   Substance and Sexual Activity    Alcohol use: No    Drug use: No    Sexual activity: Yes     Partners: Male       ROS   Review of Systems   Constitutional:  Negative for chills, diaphoresis and fever.   HENT:  Negative for sore throat.    Respiratory:  Positive for shortness of breath.    Cardiovascular:  Positive for chest pain.   Gastrointestinal:  Negative for diarrhea, nausea and vomiting.   Genitourinary:  Negative for dysuria.   Musculoskeletal:  Negative for back pain.   Skin:  Negative for rash.   Neurological:  Negative for weakness.   Hematological:  Does not bruise/bleed easily.   All other systems reviewed and are negative.    Physical Exam      Initial Vitals [03/29/23 0852]   BP Pulse Resp Temp SpO2   131/85 95 18 98.1 °F (36.7 °C) 97 %      MAP       --          Physical Exam  Nursing Notes and Vital Signs Reviewed.  Constitutional: Patient is in no acute distress. Well-developed and well-nourished.  Head: Atraumatic. Normocephalic.  Eyes: PERRL. EOM intact. Conjunctivae are not pale. No scleral icterus.  ENT: Mucous membranes are moist. Oropharynx is clear and symmetric.    Neck: Supple. Full ROM. No lymphadenopathy.  Cardiovascular: Regular rate. Regular rhythm. No murmurs, rubs, or gallops. Distal pulses are 2+ and symmetric.  Pulmonary/Chest: No respiratory distress. Clear to auscultation bilaterally. No wheezing or rales.  Abdominal: Soft and non-distended.  There is no tenderness.  No rebound, guarding, or rigidity.   Musculoskeletal: Moves all extremities. No obvious deformities. No edema.  Skin: Warm and dry.  Neurological:  Alert, awake, and appropriate.  Normal speech.  No acute focal neurological deficits are appreciated.  Psychiatric: Normal affect. Good eye contact. Appropriate in content.    ED Course    Procedures  ED Vital  "Signs:  Vitals:    03/29/23 0852   BP: 131/85   Pulse: 95   Resp: 18   Temp: 98.1 °F (36.7 °C)   SpO2: 97%   Weight: 83 kg (182 lb 15.7 oz)   Height: 5' 1" (1.549 m)       Abnormal Lab Results:  Labs Reviewed   CBC W/ AUTO DIFFERENTIAL - Abnormal; Notable for the following components:       Result Value    Immature Grans (Abs) 0.05 (*)     Mono # 1.1 (*)     All other components within normal limits   COMPREHENSIVE METABOLIC PANEL - Abnormal; Notable for the following components:    Alkaline Phosphatase 48 (*)     Anion Gap 7 (*)     All other components within normal limits   TROPONIN I   B-TYPE NATRIURETIC PEPTIDE   PREGNANCY TEST, URINE RAPID    Narrative:     Specimen Source->Urine        All Lab Results:  Results for orders placed or performed during the hospital encounter of 03/29/23   CBC auto differential   Result Value Ref Range    WBC 9.59 3.90 - 12.70 K/uL    RBC 4.80 4.00 - 5.40 M/uL    Hemoglobin 13.0 12.0 - 16.0 g/dL    Hematocrit 39.2 37.0 - 48.5 %    MCV 82 82 - 98 fL    MCH 27.1 27.0 - 31.0 pg    MCHC 33.2 32.0 - 36.0 g/dL    RDW 13.0 11.5 - 14.5 %    Platelets 257 150 - 450 K/uL    MPV 10.2 9.2 - 12.9 fL    Immature Granulocytes 0.5 0.0 - 0.5 %    Gran # (ANC) 5.3 1.8 - 7.7 K/uL    Immature Grans (Abs) 0.05 (H) 0.00 - 0.04 K/uL    Lymph # 2.8 1.0 - 4.8 K/uL    Mono # 1.1 (H) 0.3 - 1.0 K/uL    Eos # 0.2 0.0 - 0.5 K/uL    Baso # 0.06 0.00 - 0.20 K/uL    nRBC 0 0 /100 WBC    Gran % 55.7 38.0 - 73.0 %    Lymph % 29.4 18.0 - 48.0 %    Mono % 11.9 4.0 - 15.0 %    Eosinophil % 1.9 0.0 - 8.0 %    Basophil % 0.6 0.0 - 1.9 %    Differential Method Automated    Comprehensive metabolic panel   Result Value Ref Range    Sodium 138 136 - 145 mmol/L    Potassium 3.9 3.5 - 5.1 mmol/L    Chloride 103 95 - 110 mmol/L    CO2 28 23 - 29 mmol/L    Glucose 87 70 - 110 mg/dL    BUN 11 6 - 20 mg/dL    Creatinine 0.8 0.5 - 1.4 mg/dL    Calcium 9.2 8.7 - 10.5 mg/dL    Total Protein 7.8 6.0 - 8.4 g/dL    Albumin 3.7 3.5 - " 5.2 g/dL    Total Bilirubin 0.3 0.1 - 1.0 mg/dL    Alkaline Phosphatase 48 (L) 55 - 135 U/L    AST 18 10 - 40 U/L    ALT 22 10 - 44 U/L    Anion Gap 7 (L) 8 - 16 mmol/L    eGFR >60 >60 mL/min/1.73 m^2   Troponin I #1   Result Value Ref Range    Troponin I <0.006 0.000 - 0.026 ng/mL   BNP   Result Value Ref Range    BNP 12 0 - 99 pg/mL   Pregnancy, urine rapid   Result Value Ref Range    Preg Test, Ur Negative          Imaging Results:  Imaging Results              X-Ray Chest AP Portable (Final result)  Result time 03/29/23 10:15:39      Final result by JEFERSON Nguyen Sr., MD (03/29/23 10:15:39)                   Impression:      Normal study.      Electronically signed by: Donnie Nguyen MD  Date:    03/29/2023  Time:    10:15               Narrative:    EXAMINATION:  XR CHEST AP PORTABLE    CLINICAL HISTORY:  Chest Pain;    COMPARISON:  09/20/2021    FINDINGS:  The size of the heart is normal. The lungs are clear. There is no pneumothorax.  The costophrenic angles are sharp.                                     The EKG was ordered, reviewed, and independently interpreted by the ED provider.  Interpretation time: 8:58  Rate: 85 BPM  Rhythm: normal sinus rhythm  Interpretation: Nonspecific T wave abnormality. No STEMI.           The Emergency Provider reviewed the vital signs and test results, which are outlined above.    ED Discussion     10:37 AM: Reassessed pt at this time. Discussed with pt all pertinent ED information and results. Discussed pt dx and plan of tx. Gave pt all f/u and return to the ED instructions. All questions and concerns were addressed at this time. Pt expresses understanding of information and instructions, and is comfortable with plan to discharge. Pt is stable for discharge.    I discussed with patient and/or family/caretaker that evaluation in the ED does not suggest any emergent or life threatening medical conditions requiring immediate intervention beyond what was provided in the ED,  and I believe patient is safe for discharge.  Regardless, an unremarkable evaluation in the ED does not preclude the development or presence of a serious of life threatening condition. As such, patient was instructed to return immediately for any worsening or change in current symptoms.           ED Medication(s):  Medications - No data to display     Follow-up Information       [facility].    Specialty: Internal Medicine  Contact information:  [address] [place] [id number] [phone]                             New Prescriptions    NAPROXEN (NAPROSYN) 375 MG TABLET    Take 1 tablet (375 mg total) by mouth 2 (two) times daily with meals.        Medication List        START taking these medications      naproxen 375 MG tablet  Commonly known as: NAPROSYN  Take 1 tablet (375 mg total) by mouth 2 (two) times daily with meals.            ASK your doctor about these medications      albuterol 90 mcg/actuation inhaler  Commonly known as: PROVENTIL/VENTOLIN HFA  Inhale 2 puffs into the lungs every 6 (six) hours as needed for Wheezing.     ALPRAZolam 0.5 MG tablet  Commonly known as: XANAX  TAKE 1 TABLET BY MOUTH BID AS NEEDED FOR ANXIETY     atorvastatin 10 MG tablet  Commonly known as: LIPITOR     meclizine 25 mg tablet  Commonly known as: ANTIVERT     mupirocin 2 % ointment  Commonly known as: BACTROBAN  Apply topically 3 (three) times daily.     pantoprazole 40 MG tablet  Commonly known as: PROTONIX     ranitidine 150 MG tablet  Commonly known as: ZANTAC  Take 1 tablet (150 mg total) by mouth nightly.               Where to Get Your Medications        These medications were sent to [id number] - [place] - [address] [facility] [address]  [address] [address]      Phone: [phone]   naproxen 375 MG tablet           Medical Decision Making    Medical Decision Making:   Initial Assessment:   RIght sided chest pain since last  night  Differential Diagnosis:   Chest pain, nstemi, chest wall pain  Clinical Tests:   Lab Tests: Ordered and Reviewed  Radiological Study: Ordered and Reviewed  Medical Tests: Ordered and Reviewed  ED Management:  Labs and imaging reviewed by me.  No acute findings.  Considered admission, but patient is negative for ischemia will discharge home.           Scribe Attestation:   Scribe #1: I performed the above scribed service and the documentation accurately describes the services I performed. I attest to the accuracy of the note.    Attending:   Physician Attestation Statement for Scribe #1: I, Luiz Russell MD, personally performed the services described in this documentation, as scribed by Ivonne Mendes, in my presence, and it is both accurate and complete.          Clinical Impression       ICD-10-CM ICD-9-CM   1. Chest pain  R07.9 786.50       Disposition:   Disposition: Discharged  Condition: Stable       Luiz Russell MD  03/29/23 1113

## 2023-03-29 NOTE — Clinical Note
"Renee Jamisonlisbet Ruelas was seen and treated in our emergency department on 3/29/2023.  She may return to work on 03/31/2023.       If you have any questions or concerns, please don't hesitate to call.      Luiz Russell MD"

## 2023-03-29 NOTE — FIRST PROVIDER EVALUATION
Medical screening examination initiated.  I have conducted a focused provider triage encounter, findings are as follows:    Brief history of present illness:  Sharp right-sided chest pain since last night.  Mild shortness of breath last night but none today.    There were no vitals filed for this visit.    Pertinent physical exam:  nad, alert    Brief workup plan:  cardiac workup    Preliminary workup initiated; this workup will be continued and followed by the physician or advanced practice provider that is assigned to the patient when roomed.

## 2023-08-26 NOTE — PROGRESS NOTES
Subjective:      Patient ID: Renee Ruelas is a 40 y.o. female.    Chief Complaint: Anxiety and Shortness of Breath    Patient is new to clinic, being seen today for shortness of breath for >1mth.  Has been tested for COVID and with CXR and CTA chest negative, has been to Urgent Care and ER.  Holistic doctor, GI, allergist.  Does not feel SOB is associated with anxiety specifically.  SOB can occur at rest.  Feels she is waking up at night d/t shortness of breath.    Also has appt with mental health care provider for anxiety, 12/28    Xanax prn, nightly for sleeping      S- y  T- y  O- y, wakes up gasping for air  P- n  B- n  A- n  N- n  G- n    Previous PCP in [place], last seen in September     Shortness of Breath  This is a new problem. The current episode started more than 1 month ago. Associated symptoms include chest pain (associated with anxiousness). Pertinent negatives include no abdominal pain, fever, headaches, leg swelling, rash, rhinorrhea, sore throat, vomiting or wheezing.     Review of Systems   Constitutional: Negative for chills, diaphoresis and fever.   HENT: Negative for congestion, rhinorrhea and sore throat.    Respiratory: Positive for shortness of breath. Negative for cough and wheezing.         Denies h/o asthma/COPD  Non-smoker   Cardiovascular: Positive for chest pain (associated with anxiousness) and palpitations (only when waking from sleep). Negative for leg swelling.   Gastrointestinal: Negative for abdominal pain, constipation, diarrhea, nausea and vomiting.   Skin: Negative for rash.   Neurological: Negative for dizziness, light-headedness and headaches.   Psychiatric/Behavioral: Positive for sleep disturbance (trouble staying asleep).        Has tried hydroxyzine previously, did not work  Zoloft made her dizzy       Objective:   /86 (BP Location: Right arm, Patient Position: Sitting, BP Method: Medium (Manual))   Pulse 86   Temp 97.5 °F (36.4 °C) (Tympanic)   Ht 5'  "1.5" (1.562 m)   Wt 67.1 kg (147 lb 14.9 oz)   SpO2 99%   BMI 27.50 kg/m²   Physical Exam  Constitutional:       General: She is not in acute distress.     Appearance: Normal appearance. She is well-developed. She is not ill-appearing.   HENT:      Head: Normocephalic and atraumatic.   Cardiovascular:      Rate and Rhythm: Normal rate and regular rhythm.      Heart sounds: Normal heart sounds. No murmur.   Pulmonary:      Effort: Pulmonary effort is normal. No respiratory distress.      Breath sounds: Normal breath sounds. No decreased breath sounds.   Skin:     General: Skin is warm and dry.      Findings: No rash.   Psychiatric:         Mood and Affect: Mood is anxious.         Speech: Speech normal.         Behavior: Behavior normal.         Thought Content: Thought content normal.       Assessment:      1. Shortness of breath    2. Chest pain, unspecified type    3. Anxiety    4. Sleep disturbance       Plan:   Shortness of breath  -     albuterol (PROVENTIL/VENTOLIN HFA) 90 mcg/actuation inhaler; Inhale 2 puffs into the lungs every 6 (six) hours as needed for Wheezing.  Dispense: 8 g; Refill: 0  -     Ambulatory referral/consult to Pulmonology; Future; Expected date: 12/24/2020  -     Ambulatory referral/consult to Cardiology; Future; Expected date: 12/24/2020    Chest pain, unspecified type  -     Ambulatory referral/consult to Cardiology; Future; Expected date: 12/24/2020    Anxiety    Sleep disturbance  -     Ambulatory referral/consult to Sleep Disorders; Future; Expected date: 12/24/2020    Labs completed at [facility] provider, aside from mildly elevated cholesterol unremarkable, we will copy and scan in     From visit, I do feel SOB could be anxiety related, however, will refer to r/o any underlying etiology   She declines daily medication at this time, she does have a follow up with Psych     F/u with physician to establish care     Discussed worsening signs/symptoms and when to return to clinic or go " to ED.   Patient expresses understanding and agrees with treatment plan.    No

## 2024-05-08 NOTE — TRANSFER OF CARE
"Anesthesia Transfer of Care Note    Patient: Renee Ruelas    Procedure(s) Performed: Procedure(s) (LRB):  ESOPHAGOGASTRODUODENOSCOPY (EGD) (N/A)    Patient location: GI    Anesthesia Type: MAC    Transport from OR: Transported from OR on room air with adequate spontaneous ventilation    Post pain: adequate analgesia    Post assessment: no apparent anesthetic complications and tolerated procedure well    Post vital signs: stable    Level of consciousness: responds to stimulation    Nausea/Vomiting: no nausea/vomiting    Complications: none    Transfer of care protocol was followed      Last vitals:   Visit Vitals  /77 (Patient Position: Lying)   Pulse 108   Temp 36.7 °C (98 °F)   Resp 18   Ht 5' 1" (1.549 m)   Wt 78 kg (172 lb)   LMP 05/29/2020   SpO2 99%   Breastfeeding No   BMI 32.50 kg/m²     "
no

## 2025-01-26 ENCOUNTER — HOSPITAL ENCOUNTER (EMERGENCY)
Facility: HOSPITAL | Age: 45
Discharge: HOME | End: 2025-01-26
Attending: EMERGENCY MEDICINE

## 2025-01-26 VITALS
WEIGHT: 184.81 LBS | HEIGHT: 61 IN | RESPIRATION RATE: 14 BRPM | OXYGEN SATURATION: 98 % | SYSTOLIC BLOOD PRESSURE: 139 MMHG | BODY MASS INDEX: 34.89 KG/M2 | TEMPERATURE: 98 F | HEART RATE: 82 BPM | DIASTOLIC BLOOD PRESSURE: 99 MMHG

## 2025-01-26 DIAGNOSIS — R09.A2 GLOBUS SENSATION: Primary | ICD-10-CM

## 2025-01-26 DIAGNOSIS — F41.9 CHRONIC ANXIETY: ICD-10-CM

## 2025-01-26 DIAGNOSIS — R07.0 THROAT PAIN: ICD-10-CM

## 2025-01-26 DIAGNOSIS — I10 CHRONIC HYPERTENSION: ICD-10-CM

## 2025-01-26 PROCEDURE — 99283 EMERGENCY DEPT VISIT LOW MDM: CPT | Mod: 25

## 2025-01-26 PROCEDURE — 25000003 PHARM REV CODE 250: Performed by: EMERGENCY MEDICINE

## 2025-01-26 RX ORDER — SUCRALFATE 1 G/1
1 TABLET ORAL 4 TIMES DAILY
Qty: 20 TABLET | Refills: 0 | Status: SHIPPED | OUTPATIENT
Start: 2025-01-26

## 2025-01-26 RX ORDER — SUCRALFATE 1 G/10ML
1 SUSPENSION ORAL
Status: COMPLETED | OUTPATIENT
Start: 2025-01-26 | End: 2025-01-26

## 2025-01-26 RX ORDER — HYDROXYZINE PAMOATE 25 MG/1
50 CAPSULE ORAL
Status: COMPLETED | OUTPATIENT
Start: 2025-01-26 | End: 2025-01-26

## 2025-01-26 RX ADMIN — SUCRALFATE 1 G: 1 SUSPENSION ORAL at 11:01

## 2025-01-26 RX ADMIN — HYDROXYZINE PAMOATE 50 MG: 25 CAPSULE ORAL at 11:01

## 2025-01-26 NOTE — ED PROVIDER NOTES
SCRIBE #1 NOTE: I, Levy Myrick, am scribing for, and in the presence of, Lynsey Agustin MD. I have scribed the entire note.       History     Chief Complaint   Patient presents with    throat discomfort     Pt states her throat feels like its closing but has taken xanax with some relief but the feeling is still there, Pt reports having dilation of esophagus several years ago for this problem.      Review of patient's allergies indicates:   Allergen Reactions    Sulfa (sulfonamide antibiotics) Swelling         History of Present Illness     HPI    1/26/2025, 10:56 AM  History obtained from the patient      History of Present Illness: Renee Ruelas is a 44 y.o. female patient with a PMHx of anxiety, GERD, esophageal stricture, and hypercholesterolemia who presents to the Emergency Department for evaluation of throat discomfort which onset gradually PTA. Pt states her throat feels tight. Symptoms are constant and moderate in severity. No mitigating or exacerbating factors reported. No associated sxs reported. Pt mentioned having her esophagus dilated twice years ago for similar sxs. Pt denies use of new medications, but she states she started taking her blood pressure medications again yesterday. Patient denies any difficulty swallowing, fever, vomiting, drooling, SOB, and all other sxs at this time. Pt reports xanax normally alleviates her sxs, but xanax only provided some relief when taken. No further complaints or concerns at this time.       Arrival mode: Personal vehicle    PCP: Brissa Domínguez FNP-C        Past Medical History:  Past Medical History:   Diagnosis Date    Anxiety     Esophageal stricture     GERD (gastroesophageal reflux disease)     Hypercholesterolemia     Migraine headache        Past Surgical History:  Past Surgical History:   Procedure Laterality Date    ESOPHAGEAL DILATION      ESOPHAGOGASTRODUODENOSCOPY      ESOPHAGOGASTRODUODENOSCOPY N/A 6/30/2020    Procedure:  ESOPHAGOGASTRODUODENOSCOPY (EGD);  Surgeon: Kishor Cordero MD;  Location: [facility];  Service: Endoscopy;  Laterality: N/A;  NEEDS RAPID COVID 19 TEST--PT COMING FROM [place]         Family History:  Family History   Problem Relation Name Age of Onset    Diabetes Father      Kidney disease Father      Hypertension Father      Lymphoma Mother      Colon cancer Neg Hx      Colon polyps Neg Hx      Esophageal cancer Neg Hx      Irritable bowel syndrome Neg Hx      Inflammatory bowel disease Neg Hx      Stomach cancer Neg Hx         Social History:  Social History     Tobacco Use    Smoking status: Never    Smokeless tobacco: Never   Substance and Sexual Activity    Alcohol use: No    Drug use: No    Sexual activity: Yes     Partners: Male        Review of Systems     Review of Systems   Constitutional:  Negative for fever.   HENT:  Negative for drooling and trouble swallowing.         (+) throat tightness   Respiratory:  Negative for shortness of breath.    Cardiovascular:  Negative for chest pain.   Gastrointestinal:  Negative for abdominal pain, nausea and vomiting.   Genitourinary:  Negative for dysuria.   Musculoskeletal:  Negative for back pain.   Skin:  Negative for rash.   Neurological:  Negative for weakness.   Hematological:  Does not bruise/bleed easily.   All other systems reviewed and are negative.     Physical Exam     Initial Vitals [01/26/25 1047]   BP Pulse Resp Temp SpO2   (!) 160/102 91 18 97.9 °F (36.6 °C) 97 %      MAP       --          Physical Exam  Nursing Notes and Vital Signs Reviewed.  Constitutional: Patient is in no apparent distress. Well-developed and well-nourished.  Head: Atraumatic. Normocephalic.  Eyes: PERRL. EOM intact. Conjunctivae are not pale. No scleral icterus.  ENT: Mucous membranes are moist. Oropharynx is clear and symmetric. Airway is patent. No stridor. No drooling. Pt is able to tolerate secretions. No tongue swelling. No uvula swelling.  Neck: Supple. Full ROM. No  "lymphadenopathy.  Cardiovascular: Regular rate. Regular rhythm. No murmurs, rubs, or gallops. Distal pulses are 2+ and symmetric.  Pulmonary/Chest: No respiratory distress. Clear to auscultation bilaterally. No wheezing or rales.  Abdominal: Soft and non-distended.  There is no tenderness.  No rebound, guarding, or rigidity. Good bowel sounds.  Musculoskeletal: Moves all extremities. No obvious deformities. No edema. No calf tenderness.  Skin: Warm and dry.  No rash  Neurological:  Alert, awake, and appropriate.  Normal speech.  No acute focal neurological deficits are appreciated.  Psychiatric: Normal affect. Good eye contact. Appropriate in content.     ED Course   Procedures  ED Vital Signs:  Vitals:    01/26/25 1047 01/26/25 1100 01/26/25 1103 01/26/25 1130   BP: (!) 160/102 (!) 143/101 (!) 142/105 (!) 141/100   Pulse: 91 94 92 81   Resp: 18 19 20 20   Temp: 97.9 °F (36.6 °C)      TempSrc: Oral      SpO2: 97% 98% 98% 99%   Weight: 83.8 kg (184 lb 12.8 oz)      Height: 5' 1" (1.549 m)       01/26/25 1200 01/26/25 1228   BP: (!) 137/100 (!) 139/99   Pulse: 83 82   Resp: (!) 21 14   Temp:  97.8 °F (36.6 °C)   TempSrc:  Oral   SpO2: 100% 98%   Weight:     Height:         Abnormal Lab Results:  Labs Reviewed - No data to display     All Lab Results:  none    Imaging Results:  Imaging Results              X-Ray Neck Soft Tissue (Final result)  Result time 01/26/25 11:43:57      Final result by Mehrdad Anna MD (01/26/25 11:43:57)                   Impression:      As above.  Further evaluation as needed.      Electronically signed by: Mehrdad Anna  Date:    01/26/2025  Time:    11:43               Narrative:    EXAMINATION:  XR NECK SOFT TISSUE    CLINICAL HISTORY:  Pain in throat    TECHNIQUE:  AP and lateral soft tissue views the neck were performed.    COMPARISON:  None.    FINDINGS:  No definite radiopaque foreign body.  No definite soft tissue swelling.  Osseous structures as expected for age.            "                            An EKG was not ordered.           The Emergency Provider reviewed the vital signs and test results, which are outlined above.     ED Discussion       12:21 PM: Reassessed pt at this time.  Pt states her condition has improved at this time. Pt is tolerating PO. Discussed with pt all pertinent ED information and results. Discussed pt dx and plan of tx. Gave pt all f/u and return to the ED instructions. All questions and concerns were addressed at this time. Pt expresses understanding of information and instructions, and is comfortable with plan to discharge. Pt is stable for discharge.    I discussed with patient and/or family/caretaker that evaluation in the ED does not suggest any emergent or life threatening medical conditions requiring immediate intervention beyond what was provided in the ED, and I believe patient is safe for discharge.  Regardless, an unremarkable evaluation in the ED does not preclude the development or presence of a serious of life threatening condition. As such, patient was instructed to return immediately for any worsening or change in current symptoms.         Medical Decision Making  DDX: 1. Anxiety 2. GERD 3. Esophagitis    Xray reviewed and normal, exam normal, given carafate and vystaril she is feeling better, she is tolerating po, no concerns for infection, allergic rxn, reassurance provided    Amount and/or Complexity of Data Reviewed  Radiology: ordered. Decision-making details documented in ED Course.    Risk  Prescription drug management.                ED Medication(s):  Medications   sucralfate 100 mg/mL suspension 1 g (1 g Oral Given 1/26/25 1145)   hydrOXYzine pamoate capsule 50 mg (50 mg Oral Given 1/26/25 1145)       Discharge Medication List as of 1/26/2025 12:21 PM        START taking these medications    Details   sucralfate (CARAFATE) 1 gram tablet Take 1 tablet (1 g total) by mouth 4 (four) times daily., Starting Sun 1/26/2025, Normal               Follow-up Information       Brissa Domínguez FNP-C. Schedule an appointment as soon as possible for a visit in 2 days.    Specialty: Internal Medicine  Why: Return to the Emergency Room, If symptoms worsen  Contact information:  [id number] [place] [id number]  [phone]                                 Scribe Attestation:   Scribe #1: I performed the above scribed service and the documentation accurately describes the services I performed. I attest to the accuracy of the note.     Attending:   Physician Attestation Statement for Scribe #1: I, Lynsey Agustin MD, personally performed the services described in this documentation, as scribed by Levy Myrick, in my presence, and it is both accurate and complete.           Clinical Impression       ICD-10-CM ICD-9-CM   1. Globus sensation  R09.A2 784.99   2. Throat pain  R07.0 784.1   3. Chronic anxiety  F41.9 300.00   4. Chronic hypertension  I10 401.9       Disposition:   Disposition: Discharged  Condition: Stable         Lynsey Agustin MD  01/31/25 [id number]